# Patient Record
Sex: MALE | Race: WHITE | NOT HISPANIC OR LATINO
[De-identification: names, ages, dates, MRNs, and addresses within clinical notes are randomized per-mention and may not be internally consistent; named-entity substitution may affect disease eponyms.]

---

## 2017-01-10 ENCOUNTER — RX RENEWAL (OUTPATIENT)
Age: 72
End: 2017-01-10

## 2017-02-28 ENCOUNTER — RX RENEWAL (OUTPATIENT)
Age: 72
End: 2017-02-28

## 2017-03-28 ENCOUNTER — APPOINTMENT (OUTPATIENT)
Dept: INTERNAL MEDICINE | Facility: CLINIC | Age: 72
End: 2017-03-28

## 2017-03-28 VITALS
WEIGHT: 201 LBS | BODY MASS INDEX: 27.22 KG/M2 | DIASTOLIC BLOOD PRESSURE: 75 MMHG | SYSTOLIC BLOOD PRESSURE: 125 MMHG | HEIGHT: 72 IN | HEART RATE: 66 BPM

## 2017-03-28 DIAGNOSIS — Z23 ENCOUNTER FOR IMMUNIZATION: ICD-10-CM

## 2017-03-29 LAB
ALBUMIN SERPL ELPH-MCNC: 4.2 G/DL
ALP BLD-CCNC: 79 U/L
ALT SERPL-CCNC: 21 U/L
ANION GAP SERPL CALC-SCNC: 18 MMOL/L
AST SERPL-CCNC: 20 U/L
BASOPHILS # BLD AUTO: 0.03 K/UL
BASOPHILS NFR BLD AUTO: 0.4 %
BILIRUB SERPL-MCNC: 0.6 MG/DL
BUN SERPL-MCNC: 17 MG/DL
CALCIUM SERPL-MCNC: 9.2 MG/DL
CHLORIDE SERPL-SCNC: 102 MMOL/L
CHOLEST SERPL-MCNC: 166 MG/DL
CHOLEST/HDLC SERPL: 3.9 RATIO
CO2 SERPL-SCNC: 20 MMOL/L
CREAT SERPL-MCNC: 1.08 MG/DL
EOSINOPHIL # BLD AUTO: 0.13 K/UL
EOSINOPHIL NFR BLD AUTO: 1.5 %
GLUCOSE SERPL-MCNC: 123 MG/DL
HCT VFR BLD CALC: 44 %
HDLC SERPL-MCNC: 43 MG/DL
HGB BLD-MCNC: 14.4 G/DL
IMM GRANULOCYTES NFR BLD AUTO: 0.1 %
LDLC SERPL CALC-MCNC: 101 MG/DL
LYMPHOCYTES # BLD AUTO: 1.6 K/UL
LYMPHOCYTES NFR BLD AUTO: 19 %
MAN DIFF?: NORMAL
MCHC RBC-ENTMCNC: 30.1 PG
MCHC RBC-ENTMCNC: 32.7 GM/DL
MCV RBC AUTO: 92.1 FL
MONOCYTES # BLD AUTO: 0.51 K/UL
MONOCYTES NFR BLD AUTO: 6.1 %
NEUTROPHILS # BLD AUTO: 6.12 K/UL
NEUTROPHILS NFR BLD AUTO: 72.9 %
PLATELET # BLD AUTO: 214 K/UL
POTASSIUM SERPL-SCNC: 4.5 MMOL/L
PROT SERPL-MCNC: 7.2 G/DL
RBC # BLD: 4.78 M/UL
RBC # FLD: 14.1 %
SODIUM SERPL-SCNC: 140 MMOL/L
TRIGL SERPL-MCNC: 109 MG/DL
TSH SERPL-ACNC: 4.03 UIU/ML
WBC # FLD AUTO: 8.4 K/UL

## 2017-06-14 ENCOUNTER — RX RENEWAL (OUTPATIENT)
Age: 72
End: 2017-06-14

## 2017-06-21 ENCOUNTER — FORM ENCOUNTER (OUTPATIENT)
Age: 72
End: 2017-06-21

## 2017-06-22 ENCOUNTER — APPOINTMENT (OUTPATIENT)
Dept: ULTRASOUND IMAGING | Facility: CLINIC | Age: 72
End: 2017-06-22

## 2017-06-22 ENCOUNTER — APPOINTMENT (OUTPATIENT)
Dept: INTERNAL MEDICINE | Facility: CLINIC | Age: 72
End: 2017-06-22

## 2017-06-22 ENCOUNTER — OUTPATIENT (OUTPATIENT)
Dept: OUTPATIENT SERVICES | Facility: HOSPITAL | Age: 72
LOS: 1 days | End: 2017-06-22
Payer: MEDICARE

## 2017-06-22 ENCOUNTER — EMERGENCY (EMERGENCY)
Facility: HOSPITAL | Age: 72
LOS: 1 days | Discharge: HOME | End: 2017-06-22
Attending: EMERGENCY MEDICINE
Payer: MEDICARE

## 2017-06-22 VITALS
HEIGHT: 72 IN | BODY MASS INDEX: 27.77 KG/M2 | WEIGHT: 205 LBS | SYSTOLIC BLOOD PRESSURE: 130 MMHG | DIASTOLIC BLOOD PRESSURE: 75 MMHG | HEART RATE: 69 BPM

## 2017-06-22 VITALS
HEART RATE: 63 BPM | WEIGHT: 205.03 LBS | OXYGEN SATURATION: 98 % | DIASTOLIC BLOOD PRESSURE: 73 MMHG | TEMPERATURE: 98 F | RESPIRATION RATE: 18 BRPM | SYSTOLIC BLOOD PRESSURE: 164 MMHG

## 2017-06-22 DIAGNOSIS — M79.89 OTHER SPECIFIED SOFT TISSUE DISORDERS: ICD-10-CM

## 2017-06-22 DIAGNOSIS — M79.662 PAIN IN LEFT LOWER LEG: ICD-10-CM

## 2017-06-22 DIAGNOSIS — I10 ESSENTIAL (PRIMARY) HYPERTENSION: ICD-10-CM

## 2017-06-22 DIAGNOSIS — Z79.82 LONG TERM (CURRENT) USE OF ASPIRIN: ICD-10-CM

## 2017-06-22 DIAGNOSIS — I82.402 ACUTE EMBOLISM AND THROMBOSIS OF UNSPECIFIED DEEP VEINS OF LEFT LOWER EXTREMITY: ICD-10-CM

## 2017-06-22 DIAGNOSIS — E78.5 HYPERLIPIDEMIA, UNSPECIFIED: ICD-10-CM

## 2017-06-22 DIAGNOSIS — R60.9 EDEMA, UNSPECIFIED: ICD-10-CM

## 2017-06-22 DIAGNOSIS — Z79.899 OTHER LONG TERM (CURRENT) DRUG THERAPY: ICD-10-CM

## 2017-06-22 PROCEDURE — 93971 EXTREMITY STUDY: CPT | Mod: 26

## 2017-06-22 PROCEDURE — 73610 X-RAY EXAM OF ANKLE: CPT

## 2017-06-22 PROCEDURE — 73562 X-RAY EXAM OF KNEE 3: CPT | Mod: 26,LT

## 2017-06-22 PROCEDURE — 99283 EMERGENCY DEPT VISIT LOW MDM: CPT

## 2017-06-22 PROCEDURE — 93971 EXTREMITY STUDY: CPT

## 2017-06-22 PROCEDURE — 73610 X-RAY EXAM OF ANKLE: CPT | Mod: 26,LT

## 2017-06-22 PROCEDURE — 73562 X-RAY EXAM OF KNEE 3: CPT

## 2017-06-22 RX ORDER — PITAVASTATIN CALCIUM 1.04 MG/1
1 TABLET, FILM COATED ORAL
Qty: 0 | Refills: 0 | COMMUNITY

## 2017-06-22 RX ORDER — ACETAMINOPHEN 500 MG
2 TABLET ORAL
Qty: 50 | Refills: 0 | OUTPATIENT
Start: 2017-06-22

## 2017-06-22 RX ORDER — APIXABAN 2.5 MG/1
2 TABLET, FILM COATED ORAL
Qty: 28 | Refills: 0
Start: 2017-06-22

## 2017-06-22 RX ORDER — ASPIRIN/CALCIUM CARB/MAGNESIUM 324 MG
1 TABLET ORAL
Qty: 0 | Refills: 0 | COMMUNITY

## 2017-06-22 RX ORDER — APIXABAN 2.5 MG/1
10 TABLET, FILM COATED ORAL ONCE
Qty: 0 | Refills: 0 | Status: COMPLETED | OUTPATIENT
Start: 2017-06-22 | End: 2017-06-22

## 2017-06-22 RX ORDER — METHYLPREDNISOLONE 4 MG/1
4 TABLET ORAL
Qty: 1 | Refills: 0 | Status: DISCONTINUED | COMMUNITY
Start: 2017-06-22 | End: 2017-06-22

## 2017-06-22 RX ADMIN — APIXABAN 10 MILLIGRAM(S): 2.5 TABLET, FILM COATED ORAL at 22:37

## 2017-06-22 NOTE — ED PROVIDER NOTE - OBJECTIVE STATEMENT
71 y/o M with PMHx of HTN, HLD presents to c/o LLE swelling/pain that began 1 week ago. Pt states he was playing golf when he "rolled" his L ankle, pt had been applying ice to the area twice daily since onset. Pt went on a trip to [place] 2 days ago and states his sx worsened and began to travel up his L leg. Pt was evaluated by PMD today, had Doppler of LLE which found blood clot. Denies fever, chills, chest pain, SOB, abd pain, n/v/d. Pt currently takes ASA. Denies hx of cardiac stents. No further complaints at this time. PMD is Dr. Fairbanks. Cardiologist is Dr. Matias. ISRA.

## 2017-06-25 ENCOUNTER — FORM ENCOUNTER (OUTPATIENT)
Age: 72
End: 2017-06-25

## 2017-06-26 ENCOUNTER — APPOINTMENT (OUTPATIENT)
Dept: CT IMAGING | Facility: CLINIC | Age: 72
End: 2017-06-26

## 2017-06-26 ENCOUNTER — APPOINTMENT (OUTPATIENT)
Dept: INTERNAL MEDICINE | Facility: CLINIC | Age: 72
End: 2017-06-26

## 2017-06-26 ENCOUNTER — OUTPATIENT (OUTPATIENT)
Dept: OUTPATIENT SERVICES | Facility: HOSPITAL | Age: 72
LOS: 1 days | End: 2017-06-26
Payer: MEDICARE

## 2017-06-26 VITALS
SYSTOLIC BLOOD PRESSURE: 124 MMHG | HEIGHT: 72 IN | BODY MASS INDEX: 28.17 KG/M2 | RESPIRATION RATE: 14 BRPM | OXYGEN SATURATION: 97 % | WEIGHT: 208 LBS | DIASTOLIC BLOOD PRESSURE: 88 MMHG | HEART RATE: 66 BPM

## 2017-06-26 DIAGNOSIS — I82.412 ACUTE EMBOLISM AND THROMBOSIS OF LEFT FEMORAL VEIN: ICD-10-CM

## 2017-06-26 PROCEDURE — 71260 CT THORAX DX C+: CPT

## 2017-06-26 PROCEDURE — 74177 CT ABD & PELVIS W/CONTRAST: CPT

## 2017-06-26 PROCEDURE — 82565 ASSAY OF CREATININE: CPT

## 2017-06-27 ENCOUNTER — RX RENEWAL (OUTPATIENT)
Age: 72
End: 2017-06-27

## 2017-06-28 ENCOUNTER — OUTPATIENT (OUTPATIENT)
Dept: OUTPATIENT SERVICES | Facility: HOSPITAL | Age: 72
LOS: 1 days | Discharge: HOME | End: 2017-06-28

## 2017-06-28 DIAGNOSIS — I74.9 EMBOLISM AND THROMBOSIS OF UNSPECIFIED ARTERY: ICD-10-CM

## 2017-06-29 DIAGNOSIS — M79.89 OTHER SPECIFIED SOFT TISSUE DISORDERS: ICD-10-CM

## 2017-06-29 DIAGNOSIS — I82.412 ACUTE EMBOLISM AND THROMBOSIS OF LEFT FEMORAL VEIN: ICD-10-CM

## 2017-06-29 DIAGNOSIS — M25.572 PAIN IN LEFT ANKLE AND JOINTS OF LEFT FOOT: ICD-10-CM

## 2017-06-29 DIAGNOSIS — M25.562 PAIN IN LEFT KNEE: ICD-10-CM

## 2017-06-30 ENCOUNTER — RESULT REVIEW (OUTPATIENT)
Age: 72
End: 2017-06-30

## 2017-06-30 ENCOUNTER — LABORATORY RESULT (OUTPATIENT)
Age: 72
End: 2017-06-30

## 2017-06-30 ENCOUNTER — APPOINTMENT (OUTPATIENT)
Dept: HEMATOLOGY ONCOLOGY | Facility: CLINIC | Age: 72
End: 2017-06-30

## 2017-06-30 VITALS
DIASTOLIC BLOOD PRESSURE: 74 MMHG | SYSTOLIC BLOOD PRESSURE: 154 MMHG | RESPIRATION RATE: 16 BRPM | HEART RATE: 66 BPM | HEIGHT: 70.47 IN | BODY MASS INDEX: 29.82 KG/M2 | OXYGEN SATURATION: 66 % | TEMPERATURE: 97.9 F | WEIGHT: 210.65 LBS

## 2017-06-30 LAB
BASOPHILS # BLD AUTO: 0.1 K/UL — SIGNIFICANT CHANGE UP (ref 0–0.2)
BASOPHILS NFR BLD AUTO: 1 % — SIGNIFICANT CHANGE UP (ref 0–2)
EOSINOPHIL # BLD AUTO: 0.3 K/UL — SIGNIFICANT CHANGE UP (ref 0–0.5)
EOSINOPHIL NFR BLD AUTO: 3.6 % — SIGNIFICANT CHANGE UP (ref 0–6)
HCT VFR BLD CALC: 41 % — SIGNIFICANT CHANGE UP (ref 39–50)
HGB BLD-MCNC: 13.4 G/DL — SIGNIFICANT CHANGE UP (ref 13–17)
LYMPHOCYTES # BLD AUTO: 1.3 K/UL — SIGNIFICANT CHANGE UP (ref 1–3.3)
LYMPHOCYTES # BLD AUTO: 18 % — SIGNIFICANT CHANGE UP (ref 13–44)
MCHC RBC-ENTMCNC: 29.8 PG — SIGNIFICANT CHANGE UP (ref 27–34)
MCHC RBC-ENTMCNC: 32.8 GM/DL — SIGNIFICANT CHANGE UP (ref 32–36)
MCV RBC AUTO: 90.9 FL — SIGNIFICANT CHANGE UP (ref 80–100)
MONOCYTES # BLD AUTO: 0.7 K/UL — SIGNIFICANT CHANGE UP (ref 0–0.9)
MONOCYTES NFR BLD AUTO: 9.4 % — SIGNIFICANT CHANGE UP (ref 2–14)
NEUTROPHILS # BLD AUTO: 4.8 K/UL — SIGNIFICANT CHANGE UP (ref 1.8–7.4)
NEUTROPHILS NFR BLD AUTO: 67.9 % — SIGNIFICANT CHANGE UP (ref 43–77)
PLATELET # BLD AUTO: 301 K/UL — SIGNIFICANT CHANGE UP (ref 150–400)
RBC # BLD: 4.51 M/UL — SIGNIFICANT CHANGE UP (ref 4.2–5.8)
RBC # FLD: 12.7 % — SIGNIFICANT CHANGE UP (ref 10.3–14.5)
WBC # BLD: 7.1 K/UL — SIGNIFICANT CHANGE UP (ref 3.8–10.5)
WBC # FLD AUTO: 7.1 K/UL — SIGNIFICANT CHANGE UP (ref 3.8–10.5)

## 2017-07-03 ENCOUNTER — RX RENEWAL (OUTPATIENT)
Age: 72
End: 2017-07-03

## 2017-07-03 DIAGNOSIS — I82.412 ACUTE EMBOLISM AND THROMBOSIS OF LEFT FEMORAL VEIN: ICD-10-CM

## 2017-07-12 ENCOUNTER — APPOINTMENT (OUTPATIENT)
Dept: INTERNAL MEDICINE | Facility: CLINIC | Age: 72
End: 2017-07-12

## 2017-07-12 VITALS
HEART RATE: 68 BPM | DIASTOLIC BLOOD PRESSURE: 84 MMHG | WEIGHT: 210 LBS | HEIGHT: 70 IN | SYSTOLIC BLOOD PRESSURE: 136 MMHG | RESPIRATION RATE: 16 BRPM | BODY MASS INDEX: 30.06 KG/M2

## 2017-07-12 DIAGNOSIS — M79.605 PAIN IN LEFT LEG: ICD-10-CM

## 2017-07-13 LAB
ALBUMIN SERPL ELPH-MCNC: 4.3 G/DL
ALP BLD-CCNC: 76 U/L
ALT SERPL-CCNC: 17 U/L
ANION GAP SERPL CALC-SCNC: 20 MMOL/L
AST SERPL-CCNC: 18 U/L
BASOPHILS # BLD AUTO: 0.02 K/UL
BASOPHILS NFR BLD AUTO: 0.3 %
BILIRUB SERPL-MCNC: 0.4 MG/DL
BUN SERPL-MCNC: 16 MG/DL
CALCIUM SERPL-MCNC: 9.1 MG/DL
CHLORIDE SERPL-SCNC: 102 MMOL/L
CHOLEST SERPL-MCNC: 167 MG/DL
CHOLEST/HDLC SERPL: 3.8 RATIO
CO2 SERPL-SCNC: 20 MMOL/L
CREAT SERPL-MCNC: 0.93 MG/DL
EOSINOPHIL # BLD AUTO: 0.21 K/UL
EOSINOPHIL NFR BLD AUTO: 2.6 %
GLUCOSE SERPL-MCNC: 89 MG/DL
HCT VFR BLD CALC: 40.7 %
HDLC SERPL-MCNC: 44 MG/DL
HGB BLD-MCNC: 13.1 G/DL
IMM GRANULOCYTES NFR BLD AUTO: 0.4 %
LDLC SERPL CALC-MCNC: 96 MG/DL
LYMPHOCYTES # BLD AUTO: 1.58 K/UL
LYMPHOCYTES NFR BLD AUTO: 19.8 %
MAN DIFF?: NORMAL
MCHC RBC-ENTMCNC: 29.5 PG
MCHC RBC-ENTMCNC: 32.2 GM/DL
MCV RBC AUTO: 91.7 FL
MONOCYTES # BLD AUTO: 0.66 K/UL
MONOCYTES NFR BLD AUTO: 8.3 %
NEUTROPHILS # BLD AUTO: 5.49 K/UL
NEUTROPHILS NFR BLD AUTO: 68.6 %
PLATELET # BLD AUTO: 215 K/UL
POTASSIUM SERPL-SCNC: 4.3 MMOL/L
PROT SERPL-MCNC: 7.6 G/DL
RBC # BLD: 4.44 M/UL
RBC # FLD: 14.5 %
SODIUM SERPL-SCNC: 142 MMOL/L
TRIGL SERPL-MCNC: 135 MG/DL
TSH SERPL-ACNC: 4.11 UIU/ML
WBC # FLD AUTO: 7.99 K/UL

## 2017-07-19 LAB
B2 GLYCOPROT1 IGA SERPL IA-ACNC: <5 SAU
CARDIOLIPIN AB SER IA-ACNC: NEGATIVE
CARDIOLIPIN IGM SER-MCNC: 5.1 MPL
CARDIOLIPIN IGM SER-MCNC: <5 GPL

## 2017-08-28 ENCOUNTER — RX RENEWAL (OUTPATIENT)
Age: 72
End: 2017-08-28

## 2017-09-22 ENCOUNTER — OUTPATIENT (OUTPATIENT)
Dept: OUTPATIENT SERVICES | Facility: HOSPITAL | Age: 72
LOS: 1 days | Discharge: HOME | End: 2017-09-22

## 2017-09-22 DIAGNOSIS — I82.412 ACUTE EMBOLISM AND THROMBOSIS OF LEFT FEMORAL VEIN: ICD-10-CM

## 2017-09-26 ENCOUNTER — APPOINTMENT (OUTPATIENT)
Dept: HEMATOLOGY ONCOLOGY | Facility: CLINIC | Age: 72
End: 2017-09-26

## 2017-10-03 ENCOUNTER — LABORATORY RESULT (OUTPATIENT)
Age: 72
End: 2017-10-03

## 2017-10-03 ENCOUNTER — APPOINTMENT (OUTPATIENT)
Dept: INTERNAL MEDICINE | Facility: CLINIC | Age: 72
End: 2017-10-03
Payer: MEDICARE

## 2017-10-03 VITALS
HEIGHT: 70 IN | HEART RATE: 72 BPM | DIASTOLIC BLOOD PRESSURE: 86 MMHG | SYSTOLIC BLOOD PRESSURE: 148 MMHG | WEIGHT: 211 LBS | BODY MASS INDEX: 30.21 KG/M2

## 2017-10-03 PROCEDURE — 99214 OFFICE O/P EST MOD 30 MIN: CPT | Mod: 25

## 2017-10-03 PROCEDURE — G0008: CPT

## 2017-10-03 PROCEDURE — 90662 IIV NO PRSV INCREASED AG IM: CPT

## 2017-10-03 PROCEDURE — 36415 COLL VENOUS BLD VENIPUNCTURE: CPT

## 2017-10-07 LAB
ALBUMIN SERPL ELPH-MCNC: 4.2 G/DL
ALP BLD-CCNC: 69 U/L
ALT SERPL-CCNC: 18 U/L
ANION GAP SERPL CALC-SCNC: 16 MMOL/L
AST SERPL-CCNC: 18 U/L
BASOPHILS # BLD AUTO: 0.03 K/UL
BASOPHILS NFR BLD AUTO: 0.4 %
BILIRUB SERPL-MCNC: 0.4 MG/DL
BUN SERPL-MCNC: 17 MG/DL
CALCIUM SERPL-MCNC: 10.4 MG/DL
CHLORIDE SERPL-SCNC: 104 MMOL/L
CHOLEST SERPL-MCNC: 168 MG/DL
CHOLEST/HDLC SERPL: 3.9 RATIO
CO2 SERPL-SCNC: 22 MMOL/L
CREAT SERPL-MCNC: 1.1 MG/DL
EOSINOPHIL # BLD AUTO: 0.24 K/UL
EOSINOPHIL NFR BLD AUTO: 3.4 %
GLUCOSE SERPL-MCNC: 79 MG/DL
HCT VFR BLD CALC: 42.5 %
HDLC SERPL-MCNC: 43 MG/DL
HGB BLD-MCNC: 13.8 G/DL
IMM GRANULOCYTES NFR BLD AUTO: 0.3 %
LDLC SERPL CALC-MCNC: 100 MG/DL
LYMPHOCYTES # BLD AUTO: 1.76 K/UL
LYMPHOCYTES NFR BLD AUTO: 24.9 %
MAN DIFF?: NORMAL
MCHC RBC-ENTMCNC: 30.1 PG
MCHC RBC-ENTMCNC: 32.5 GM/DL
MCV RBC AUTO: 92.8 FL
MONOCYTES # BLD AUTO: 0.45 K/UL
MONOCYTES NFR BLD AUTO: 6.4 %
NEUTROPHILS # BLD AUTO: 4.56 K/UL
NEUTROPHILS NFR BLD AUTO: 64.6 %
PLATELET # BLD AUTO: 205 K/UL
POTASSIUM SERPL-SCNC: 4.9 MMOL/L
PROT SERPL-MCNC: 7.3 G/DL
RBC # BLD: 4.58 M/UL
RBC # FLD: 15.2 %
SODIUM SERPL-SCNC: 142 MMOL/L
TRIGL SERPL-MCNC: 123 MG/DL
TSH SERPL-ACNC: 5.31 UIU/ML
WBC # FLD AUTO: 7.06 K/UL

## 2017-12-05 ENCOUNTER — APPOINTMENT (OUTPATIENT)
Dept: INTERNAL MEDICINE | Facility: CLINIC | Age: 72
End: 2017-12-05
Payer: MEDICARE

## 2017-12-05 VITALS
SYSTOLIC BLOOD PRESSURE: 125 MMHG | HEART RATE: 67 BPM | DIASTOLIC BLOOD PRESSURE: 80 MMHG | BODY MASS INDEX: 28.49 KG/M2 | WEIGHT: 199 LBS | RESPIRATION RATE: 11 BRPM | HEIGHT: 70 IN

## 2017-12-05 DIAGNOSIS — R79.89 OTHER SPECIFIED ABNORMAL FINDINGS OF BLOOD CHEMISTRY: ICD-10-CM

## 2017-12-05 DIAGNOSIS — Z92.29 PERSONAL HISTORY OF OTHER DRUG THERAPY: ICD-10-CM

## 2017-12-05 DIAGNOSIS — K63.5 POLYP OF COLON: ICD-10-CM

## 2017-12-05 DIAGNOSIS — S99.912A UNSPECIFIED INJURY OF LEFT ANKLE, INITIAL ENCOUNTER: ICD-10-CM

## 2017-12-05 PROCEDURE — 36415 COLL VENOUS BLD VENIPUNCTURE: CPT

## 2017-12-05 PROCEDURE — G0439: CPT

## 2017-12-06 ENCOUNTER — RESULT REVIEW (OUTPATIENT)
Age: 72
End: 2017-12-06

## 2017-12-06 LAB
ALBUMIN SERPL ELPH-MCNC: 4.5 G/DL
ALP BLD-CCNC: 71 U/L
ALT SERPL-CCNC: 17 U/L
ANION GAP SERPL CALC-SCNC: 15 MMOL/L
AST SERPL-CCNC: 18 U/L
BASOPHILS # BLD AUTO: 0.03 K/UL
BASOPHILS NFR BLD AUTO: 0.4 %
BILIRUB SERPL-MCNC: 0.5 MG/DL
BUN SERPL-MCNC: 17 MG/DL
CALCIUM SERPL-MCNC: 9.2 MG/DL
CHLORIDE SERPL-SCNC: 104 MMOL/L
CHOLEST SERPL-MCNC: 154 MG/DL
CHOLEST/HDLC SERPL: 3.4 RATIO
CO2 SERPL-SCNC: 24 MMOL/L
CREAT SERPL-MCNC: 1.03 MG/DL
EOSINOPHIL # BLD AUTO: 0.18 K/UL
EOSINOPHIL NFR BLD AUTO: 2.7 %
GLUCOSE SERPL-MCNC: 87 MG/DL
HCT VFR BLD CALC: 43 %
HDLC SERPL-MCNC: 45 MG/DL
HGB BLD-MCNC: 14.1 G/DL
IMM GRANULOCYTES NFR BLD AUTO: 0 %
LDLC SERPL CALC-MCNC: 88 MG/DL
LYMPHOCYTES # BLD AUTO: 2 K/UL
LYMPHOCYTES NFR BLD AUTO: 29.7 %
MAN DIFF?: NORMAL
MCHC RBC-ENTMCNC: 30.6 PG
MCHC RBC-ENTMCNC: 32.8 GM/DL
MCV RBC AUTO: 93.3 FL
MONOCYTES # BLD AUTO: 0.58 K/UL
MONOCYTES NFR BLD AUTO: 8.6 %
NEUTROPHILS # BLD AUTO: 3.95 K/UL
NEUTROPHILS NFR BLD AUTO: 58.6 %
PLATELET # BLD AUTO: 196 K/UL
POTASSIUM SERPL-SCNC: 4.5 MMOL/L
PROT SERPL-MCNC: 7.3 G/DL
RBC # BLD: 4.61 M/UL
RBC # FLD: 14.8 %
SODIUM SERPL-SCNC: 143 MMOL/L
TRIGL SERPL-MCNC: 107 MG/DL
TSH SERPL-ACNC: 3.21 UIU/ML
WBC # FLD AUTO: 6.74 K/UL

## 2017-12-11 ENCOUNTER — RX RENEWAL (OUTPATIENT)
Age: 72
End: 2017-12-11

## 2017-12-13 ENCOUNTER — RX RENEWAL (OUTPATIENT)
Age: 72
End: 2017-12-13

## 2018-01-02 ENCOUNTER — RX RENEWAL (OUTPATIENT)
Age: 73
End: 2018-01-02

## 2018-02-15 ENCOUNTER — APPOINTMENT (OUTPATIENT)
Dept: INTERNAL MEDICINE | Facility: CLINIC | Age: 73
End: 2018-02-15
Payer: MEDICARE

## 2018-02-15 VITALS
DIASTOLIC BLOOD PRESSURE: 80 MMHG | WEIGHT: 199 LBS | TEMPERATURE: 98 F | HEIGHT: 70 IN | BODY MASS INDEX: 28.49 KG/M2 | HEART RATE: 74 BPM | SYSTOLIC BLOOD PRESSURE: 120 MMHG

## 2018-02-15 PROCEDURE — 99214 OFFICE O/P EST MOD 30 MIN: CPT

## 2018-02-26 ENCOUNTER — RX RENEWAL (OUTPATIENT)
Age: 73
End: 2018-02-26

## 2018-04-06 ENCOUNTER — APPOINTMENT (OUTPATIENT)
Dept: INTERNAL MEDICINE | Facility: CLINIC | Age: 73
End: 2018-04-06
Payer: MEDICARE

## 2018-04-06 VITALS
HEART RATE: 68 BPM | DIASTOLIC BLOOD PRESSURE: 82 MMHG | SYSTOLIC BLOOD PRESSURE: 125 MMHG | WEIGHT: 198 LBS | BODY MASS INDEX: 28.35 KG/M2 | HEIGHT: 70 IN

## 2018-04-06 DIAGNOSIS — J06.9 ACUTE UPPER RESPIRATORY INFECTION, UNSPECIFIED: ICD-10-CM

## 2018-04-06 DIAGNOSIS — B97.89 ACUTE UPPER RESPIRATORY INFECTION, UNSPECIFIED: ICD-10-CM

## 2018-04-06 DIAGNOSIS — Z87.898 PERSONAL HISTORY OF OTHER SPECIFIED CONDITIONS: ICD-10-CM

## 2018-04-06 PROCEDURE — 36415 COLL VENOUS BLD VENIPUNCTURE: CPT

## 2018-04-06 PROCEDURE — 99214 OFFICE O/P EST MOD 30 MIN: CPT | Mod: 25

## 2018-04-06 RX ORDER — CEFDINIR 300 MG/1
300 CAPSULE ORAL TWICE DAILY
Qty: 20 | Refills: 0 | Status: COMPLETED | COMMUNITY
Start: 2018-02-15 | End: 2018-02-25

## 2018-04-09 ENCOUNTER — RESULT REVIEW (OUTPATIENT)
Age: 73
End: 2018-04-09

## 2018-04-09 LAB
ALBUMIN SERPL ELPH-MCNC: 4.5 G/DL
ALP BLD-CCNC: 73 U/L
ALT SERPL-CCNC: 20 U/L
ANION GAP SERPL CALC-SCNC: 12 MMOL/L
AST SERPL-CCNC: 16 U/L
BASOPHILS # BLD AUTO: 0.04 K/UL
BASOPHILS NFR BLD AUTO: 0.7 %
BILIRUB SERPL-MCNC: 0.5 MG/DL
BUN SERPL-MCNC: 22 MG/DL
CALCIUM SERPL-MCNC: 9.7 MG/DL
CHLORIDE SERPL-SCNC: 104 MMOL/L
CHOLEST SERPL-MCNC: 181 MG/DL
CHOLEST/HDLC SERPL: 3.5 RATIO
CO2 SERPL-SCNC: 25 MMOL/L
CREAT SERPL-MCNC: 1.16 MG/DL
EOSINOPHIL # BLD AUTO: 0.2 K/UL
EOSINOPHIL NFR BLD AUTO: 3.3 %
GLUCOSE SERPL-MCNC: 110 MG/DL
HCT VFR BLD CALC: 42.6 %
HCV AB SER QL: NONREACTIVE
HCV S/CO RATIO: 0.17 S/CO
HDLC SERPL-MCNC: 51 MG/DL
HGB BLD-MCNC: 14 G/DL
HIV1+2 AB SPEC QL IA.RAPID: NONREACTIVE
IMM GRANULOCYTES NFR BLD AUTO: 0.3 %
LDLC SERPL CALC-MCNC: 105 MG/DL
LYMPHOCYTES # BLD AUTO: 1.72 K/UL
LYMPHOCYTES NFR BLD AUTO: 28.7 %
MAN DIFF?: NORMAL
MCHC RBC-ENTMCNC: 30.4 PG
MCHC RBC-ENTMCNC: 32.9 GM/DL
MCV RBC AUTO: 92.4 FL
MONOCYTES # BLD AUTO: 0.48 K/UL
MONOCYTES NFR BLD AUTO: 8 %
NEUTROPHILS # BLD AUTO: 3.53 K/UL
NEUTROPHILS NFR BLD AUTO: 59 %
PLATELET # BLD AUTO: 212 K/UL
POTASSIUM SERPL-SCNC: 4.8 MMOL/L
PROT SERPL-MCNC: 7.3 G/DL
RBC # BLD: 4.61 M/UL
RBC # FLD: 14.7 %
SODIUM SERPL-SCNC: 141 MMOL/L
TRIGL SERPL-MCNC: 123 MG/DL
TSH SERPL-ACNC: 4.93 UIU/ML
WBC # FLD AUTO: 5.99 K/UL

## 2018-06-11 ENCOUNTER — RX RENEWAL (OUTPATIENT)
Age: 73
End: 2018-06-11

## 2018-07-18 ENCOUNTER — RX CHANGE (OUTPATIENT)
Age: 73
End: 2018-07-18

## 2018-07-24 ENCOUNTER — APPOINTMENT (OUTPATIENT)
Dept: INTERNAL MEDICINE | Facility: CLINIC | Age: 73
End: 2018-07-24
Payer: MEDICARE

## 2018-07-26 PROBLEM — R79.89 ABNORMAL THYROID BLOOD TEST: Status: RESOLVED | Noted: 2017-10-05 | Resolved: 2018-07-26

## 2018-08-25 ENCOUNTER — RX RENEWAL (OUTPATIENT)
Age: 73
End: 2018-08-25

## 2018-08-30 ENCOUNTER — APPOINTMENT (OUTPATIENT)
Dept: INTERNAL MEDICINE | Facility: CLINIC | Age: 73
End: 2018-08-30
Payer: MEDICARE

## 2018-08-30 VITALS
HEART RATE: 78 BPM | SYSTOLIC BLOOD PRESSURE: 130 MMHG | WEIGHT: 198 LBS | DIASTOLIC BLOOD PRESSURE: 80 MMHG | OXYGEN SATURATION: 97 % | BODY MASS INDEX: 28.35 KG/M2 | HEIGHT: 70 IN

## 2018-08-30 DIAGNOSIS — Z11.59 ENCOUNTER FOR SCREENING FOR OTHER VIRAL DISEASES: ICD-10-CM

## 2018-08-30 DIAGNOSIS — Z11.4 ENCOUNTER FOR SCREENING FOR HUMAN IMMUNODEFICIENCY VIRUS [HIV]: ICD-10-CM

## 2018-08-30 PROCEDURE — 36415 COLL VENOUS BLD VENIPUNCTURE: CPT

## 2018-08-30 PROCEDURE — 99214 OFFICE O/P EST MOD 30 MIN: CPT | Mod: 25

## 2018-08-30 RX ORDER — OMEPRAZOLE 20 MG/1
20 CAPSULE, DELAYED RELEASE ORAL
Qty: 90 | Refills: 1 | Status: DISCONTINUED | COMMUNITY
Start: 2017-06-27 | End: 2018-08-30

## 2018-08-31 ENCOUNTER — RX RENEWAL (OUTPATIENT)
Age: 73
End: 2018-08-31

## 2018-08-31 LAB
ALBUMIN SERPL ELPH-MCNC: 5 G/DL
ALP BLD-CCNC: 75 U/L
ALT SERPL-CCNC: 24 U/L
ANION GAP SERPL CALC-SCNC: 18 MMOL/L
AST SERPL-CCNC: 27 U/L
BASOPHILS # BLD AUTO: 0.03 K/UL
BASOPHILS NFR BLD AUTO: 0.4 %
BILIRUB SERPL-MCNC: 0.8 MG/DL
BUN SERPL-MCNC: 31 MG/DL
CALCIUM SERPL-MCNC: 9.7 MG/DL
CHLORIDE SERPL-SCNC: 105 MMOL/L
CHOLEST SERPL-MCNC: 178 MG/DL
CHOLEST/HDLC SERPL: 3.5 RATIO
CO2 SERPL-SCNC: 22 MMOL/L
CREAT SERPL-MCNC: 1.41 MG/DL
EOSINOPHIL # BLD AUTO: 0.08 K/UL
EOSINOPHIL NFR BLD AUTO: 1.1 %
GLUCOSE SERPL-MCNC: 91 MG/DL
HBA1C MFR BLD HPLC: 5.9 %
HCT VFR BLD CALC: 43 %
HDLC SERPL-MCNC: 51 MG/DL
HGB BLD-MCNC: 14.4 G/DL
IMM GRANULOCYTES NFR BLD AUTO: 0.3 %
LDLC SERPL CALC-MCNC: 111 MG/DL
LYMPHOCYTES # BLD AUTO: 2.24 K/UL
LYMPHOCYTES NFR BLD AUTO: 30.2 %
MAN DIFF?: NORMAL
MCHC RBC-ENTMCNC: 31.4 PG
MCHC RBC-ENTMCNC: 33.5 GM/DL
MCV RBC AUTO: 93.9 FL
MONOCYTES # BLD AUTO: 0.48 K/UL
MONOCYTES NFR BLD AUTO: 6.5 %
NEUTROPHILS # BLD AUTO: 4.57 K/UL
NEUTROPHILS NFR BLD AUTO: 61.5 %
PLATELET # BLD AUTO: 207 K/UL
POTASSIUM SERPL-SCNC: 4.3 MMOL/L
PROT SERPL-MCNC: 7.6 G/DL
RBC # BLD: 4.58 M/UL
RBC # FLD: 15 %
SODIUM SERPL-SCNC: 144 MMOL/L
TRIGL SERPL-MCNC: 80 MG/DL
TSH SERPL-ACNC: 4.55 UIU/ML
WBC # FLD AUTO: 7.42 K/UL

## 2018-08-31 NOTE — HISTORY OF PRESENT ILLNESS
[FreeTextEntry1] : Patient presents for followup on hypertension/hyperlipidemia/hypothyroid. Patient is currently fasting for today's labs and offers no acute complaints. Patient is currently on Avapro/Toprol for hypertension, on Livalo for hyperlipidemia and is on Synthroid for his hypothyroidism.\par -Last sugar was elevated at 110, hemoglobin A1c to be checked

## 2018-08-31 NOTE — ASSESSMENT
[FreeTextEntry1] : Labs will be sent out/ further recommendations will be made based on lab results. Patient advised to continue present medications with diet/exercise and specialist followup. Patient will return to the office in dec for CPE\par \par colonoscopy was 4/15, follow up in 3 years " to do, needs to clear with cardio"-Dr. Lundberg\par Shingrix d/w pt\par specialists include\par 1. cardiology-Dr. Matias\par 2. ophthalmology-Dr. Crawford\par 3. urology yearly-Dr. Lowe-may stop going and just have DALJIT and PSA here Qyear\par 4. podiatry on  a p.r.n. basis-Dr. Mercado/Dr. Kim\par 5.hematology-Dr. Liu-no need for follow up\par 6.  Vascular-Dr. Lennon-no need for follow up\par HIV screen=4/2018\par Hepatitis C screening=4/2018

## 2018-08-31 NOTE — ADDENDUM
[FreeTextEntry1] : Labs show\par -BUN/creatinine elevated at 31/1.4, repeat in 3 weeks\par -LDL of 111-to take cholesterol medication daily\par -TSH elevated at 4.5-increase Synthroid to 0.137 mcg daily

## 2018-09-05 ENCOUNTER — RX RENEWAL (OUTPATIENT)
Age: 73
End: 2018-09-05

## 2018-09-05 PROBLEM — I10 ESSENTIAL (PRIMARY) HYPERTENSION: Chronic | Status: ACTIVE | Noted: 2017-06-22

## 2018-09-05 PROBLEM — E03.9 HYPOTHYROIDISM, UNSPECIFIED: Chronic | Status: ACTIVE | Noted: 2017-06-22

## 2018-09-26 ENCOUNTER — APPOINTMENT (OUTPATIENT)
Dept: INTERNAL MEDICINE | Facility: CLINIC | Age: 73
End: 2018-09-26
Payer: MEDICARE

## 2018-09-26 VITALS
OXYGEN SATURATION: 98 % | DIASTOLIC BLOOD PRESSURE: 82 MMHG | WEIGHT: 203 LBS | SYSTOLIC BLOOD PRESSURE: 125 MMHG | BODY MASS INDEX: 29.06 KG/M2 | HEIGHT: 70 IN | HEART RATE: 60 BPM

## 2018-09-26 PROCEDURE — G0008: CPT

## 2018-09-26 PROCEDURE — 90662 IIV NO PRSV INCREASED AG IM: CPT

## 2018-09-26 PROCEDURE — 99213 OFFICE O/P EST LOW 20 MIN: CPT | Mod: 25

## 2018-09-26 PROCEDURE — 36415 COLL VENOUS BLD VENIPUNCTURE: CPT

## 2018-09-26 NOTE — HISTORY OF PRESENT ILLNESS
[FreeTextEntry1] : Patient presents for followup on hypertension/repeat labs Patient offers no acute complaints. Patient is currently on Avapro/Toprol for hypertension And was found to have BUN/creatinine elevated at 31/1.4 with previous blood work, abnormal results discussed with patient and questions answered

## 2018-09-26 NOTE — ASSESSMENT
[FreeTextEntry1] : Labs will be sent out/ further recommendations will be made based on lab results. Patient advised to continue present medications with diet/exercise and specialist followup.High-dose flu vaccine given without reaction. Patient will return to the office in dec for CPE\par \par colonoscopy was 4/15, follow up in 3 years " to do, needs to clear with cardio"-Dr. Lundberg\par Shingrix d/w pt\par specialists include\par 1. cardiology-Dr. Matias\par 2. ophthalmology-Dr. Crawford\par 3. urology yearly-Dr. Lowe-may stop going and just have DALJIT and PSA here Qyear\par 4. podiatry on  a p.r.n. basis-Dr. Mercado/Dr. Kim\par 5.hematology-Dr. Liu-no need for follow up\par 6.  Vascular-Dr. Lennon-no need for follow up\par HIV screen=4/2018\par Hepatitis C screening=4/2018

## 2018-09-27 ENCOUNTER — RESULT REVIEW (OUTPATIENT)
Age: 73
End: 2018-09-27

## 2018-09-27 LAB
ANION GAP SERPL CALC-SCNC: 16 MMOL/L
BUN SERPL-MCNC: 21 MG/DL
CALCIUM SERPL-MCNC: 9.4 MG/DL
CHLORIDE SERPL-SCNC: 101 MMOL/L
CO2 SERPL-SCNC: 24 MMOL/L
CREAT SERPL-MCNC: 1.2 MG/DL
GLUCOSE SERPL-MCNC: 97 MG/DL
POTASSIUM SERPL-SCNC: 4.5 MMOL/L
SODIUM SERPL-SCNC: 140 MMOL/L

## 2018-10-29 ENCOUNTER — RX RENEWAL (OUTPATIENT)
Age: 73
End: 2018-10-29

## 2018-12-05 ENCOUNTER — APPOINTMENT (OUTPATIENT)
Dept: INTERNAL MEDICINE | Facility: CLINIC | Age: 73
End: 2018-12-05
Payer: MEDICARE

## 2018-12-05 VITALS
WEIGHT: 198 LBS | HEART RATE: 57 BPM | SYSTOLIC BLOOD PRESSURE: 130 MMHG | BODY MASS INDEX: 28.35 KG/M2 | HEIGHT: 70 IN | DIASTOLIC BLOOD PRESSURE: 80 MMHG | RESPIRATION RATE: 12 BRPM

## 2018-12-05 DIAGNOSIS — I26.99 OTHER PULMONARY EMBOLISM W/OUT ACUTE COR PULMONALE: ICD-10-CM

## 2018-12-05 PROCEDURE — 36415 COLL VENOUS BLD VENIPUNCTURE: CPT

## 2018-12-05 PROCEDURE — G0439: CPT

## 2018-12-05 NOTE — ASSESSMENT
[FreeTextEntry1] : 73-year-old male with known CAD via CTA remains asymptomatic with controlled hypertension and hyperlipidemia on present medications.Patient is clinically euthyroid on thyroid replacement therapy.\par \par The patient is status post DVT with PE June 2017 and now on Eliquis since.\par The patient is to discuss with cardiology at his next visit.\par \par Negative hypercoagulable workup.\par \par \par To continue present medications\par Continue to diet and exercise encouraged\par \par Followup with specialists as scheduled\par Colonoscopy October 2018 with followup in 5 years\par \par Influenza vaccine already received\par Up to date with all of the vaccines\par \par Followup in 3-4 months

## 2018-12-05 NOTE — PHYSICAL EXAM
[General Appearance - Alert] : alert [General Appearance - In No Acute Distress] : in no acute distress [Sclera] : the sclera and conjunctiva were normal [PERRL With Normal Accommodation] : pupils were equal in size, round, and reactive to light [Extraocular Movements] : extraocular movements were intact [Outer Ear] : the ears and nose were normal in appearance [Oropharynx] : the oropharynx was normal [Neck Appearance] : the appearance of the neck was normal [Neck Cervical Mass (___cm)] : no neck mass was observed [Jugular Venous Distention Increased] : there was no jugular-venous distention [Thyroid Diffuse Enlargement] : the thyroid was not enlarged [Thyroid Nodule] : there were no palpable thyroid nodules [Auscultation Breath Sounds / Voice Sounds] : lungs were clear to auscultation bilaterally [Heart Rate And Rhythm] : heart rate was normal and rhythm regular [Heart Sounds] : normal S1 and S2 [Heart Sounds Gallop] : no gallops [Murmurs] : no murmurs [Heart Sounds Pericardial Friction Rub] : no pericardial rub [Arterial Pulses Carotid] : carotid pulses were normal with no bruits [Abdominal Aorta] : the abdominal aorta was normal [Arterial Pulses Femoral] : femoral pulses were normal without bruits [Full Pulse] : the pedal pulses are present [Edema] : there was no peripheral edema [Veins - Varicosity Changes] : there were no varicosital changes [Bowel Sounds] : normal bowel sounds [Abdomen Soft] : soft [Abdomen Tenderness] : non-tender [Abdomen Mass (___ Cm)] : no abdominal mass palpated [Cervical Lymph Nodes Enlarged Posterior Bilaterally] : posterior cervical [Cervical Lymph Nodes Enlarged Anterior Bilaterally] : anterior cervical [Supraclavicular Lymph Nodes Enlarged Bilaterally] : supraclavicular [Axillary Lymph Nodes Enlarged Bilaterally] : axillary [Femoral Lymph Nodes Enlarged Bilaterally] : femoral [Inguinal Lymph Nodes Enlarged Bilaterally] : inguinal [No Spinal Tenderness] : no spinal tenderness [Abnormal Walk] : normal gait [Nail Clubbing] : no clubbing  or cyanosis of the fingernails [Musculoskeletal - Swelling] : no joint swelling seen [Motor Tone] : muscle strength and tone were normal [Skin Color & Pigmentation] : normal skin color and pigmentation [Skin Turgor] : normal skin turgor [] : no rash [Cranial Nerves] : cranial nerves 2-12 were intact [No Focal Deficits] : no focal deficits [Oriented To Time, Place, And Person] : oriented to person, place, and time [Impaired Insight] : insight and judgment were intact [Affect] : the affect was normal [FreeTextEntry1] : via urology

## 2018-12-05 NOTE — HEALTH RISK ASSESSMENT
[Good] : ~his/her~ current health as good [No falls in past year] : Patient reported no falls in the past year [0] : 2) Feeling down, depressed, or hopeless: Not at all (0) [None] : None [With Significant Other] : lives with significant other [# of Members in Household ___] :  household currently consist of [unfilled] member(s) [Retired] : retired [College] : College [] :  [# Of Children ___] : has [unfilled] children [Sexually Active] : sexually active [Feels Safe at Home] : Feels safe at home [Fully functional (bathing, dressing, toileting, transferring, walking, feeding)] : Fully functional (bathing, dressing, toileting, transferring, walking, feeding) [Fully functional (using the telephone, shopping, preparing meals, housekeeping, doing laundry, using] : Fully functional and needs no help or supervision to perform IADLs (using the telephone, shopping, preparing meals, housekeeping, doing laundry, using transportation, managing medications and managing finances) [Smoke Detector] : smoke detector [Carbon Monoxide Detector] : carbon monoxide detector [Guns at Home] : guns at home [Seat Belt] :  uses seat belt [Sunscreen] : uses sunscreen [Discussed at today's visit] : Advance Directives Discussed at today's visit [Very Good] : ~his/her~  mood as very good [Designated Healthcare Proxy] : Designated healthcare proxy [Name: ___] : Health Care Proxy's Name: [unfilled]  [] : No [de-identified] : Occasional [[id number]] : 0 [Change in mental status noted] : No change in mental status noted [Reports changes in hearing] : Reports no changes in hearing [Reports changes in vision] : Reports no changes in vision [Reports changes in dental health] : Reports no changes in dental health [FreeTextEntry2] :  [de-identified] : locked and safe

## 2018-12-05 NOTE — HISTORY OF PRESENT ILLNESS
[FreeTextEntry1] : 73-year-old male with history of CAD on CTA as well as hypertension on valsartan and metoprolol and hyperlipidemia on Livalo presents for his yearly physical.\par \par Patient also with history of hypothyroidism on thyroid replacement therapy.\par \par  June 2017 he had an acute leg DVT with associated pulmonary emboli. This occurred after the patient had an ankle injury. Otherwise there was no preceding incident to cause his DVT.\par The patient has been on Eliquis since.\par He did see hematology with a negative hypercoagulable workup. Hematology recommended 3 months of anticoagulation.\par \par Also being followed by vascular who felt the patient should be on anticoagulation for 6 months.\par .The patient continues on Eliquis on the recommendation of cardiology secondary to his DVT being essentially unprovoked and the patient's brother having had a history of multiple clots. Therefore, concern for some familial hypercoagulability, despite negative hypercoagulable workup.\par \par Patient generally feeling well without chest pain, palpitations or shortness of breath.\par He follows with cardiology every 6 months\par He sees urology once a year.\par \par On direct questioning patient without any complaints.\par The patient has made some good lifestyle changes mainly with dietary changes.

## 2018-12-06 ENCOUNTER — RESULT REVIEW (OUTPATIENT)
Age: 73
End: 2018-12-06

## 2018-12-06 LAB
ALBUMIN SERPL ELPH-MCNC: 4.6 G/DL
ALP BLD-CCNC: 80 U/L
ALT SERPL-CCNC: 20 U/L
ANION GAP SERPL CALC-SCNC: 14 MMOL/L
APPEARANCE: CLEAR
AST SERPL-CCNC: 20 U/L
BACTERIA: NEGATIVE
BASOPHILS # BLD AUTO: 0.04 K/UL
BASOPHILS NFR BLD AUTO: 0.5 %
BILIRUB SERPL-MCNC: 0.7 MG/DL
BILIRUBIN URINE: NEGATIVE
BLOOD URINE: ABNORMAL
BUN SERPL-MCNC: 23 MG/DL
CALCIUM SERPL-MCNC: 9.8 MG/DL
CHLORIDE SERPL-SCNC: 102 MMOL/L
CHOLEST SERPL-MCNC: 156 MG/DL
CHOLEST/HDLC SERPL: 3.4 RATIO
CO2 SERPL-SCNC: 24 MMOL/L
COLOR: YELLOW
CREAT SERPL-MCNC: 1.18 MG/DL
EOSINOPHIL # BLD AUTO: 0.18 K/UL
EOSINOPHIL NFR BLD AUTO: 2.3 %
GLUCOSE QUALITATIVE U: NEGATIVE MG/DL
GLUCOSE SERPL-MCNC: 119 MG/DL
HCT VFR BLD CALC: 44.9 %
HDLC SERPL-MCNC: 46 MG/DL
HGB BLD-MCNC: 14.4 G/DL
HYALINE CASTS: 0 /LPF
IMM GRANULOCYTES NFR BLD AUTO: 0.3 %
KETONES URINE: NEGATIVE
LDLC SERPL CALC-MCNC: 93 MG/DL
LEUKOCYTE ESTERASE URINE: NEGATIVE
LYMPHOCYTES # BLD AUTO: 1.8 K/UL
LYMPHOCYTES NFR BLD AUTO: 22.6 %
MAN DIFF?: NORMAL
MCHC RBC-ENTMCNC: 30.6 PG
MCHC RBC-ENTMCNC: 32.1 GM/DL
MCV RBC AUTO: 95.5 FL
MICROSCOPIC-UA: NORMAL
MONOCYTES # BLD AUTO: 0.53 K/UL
MONOCYTES NFR BLD AUTO: 6.7 %
NEUTROPHILS # BLD AUTO: 5.38 K/UL
NEUTROPHILS NFR BLD AUTO: 67.6 %
NITRITE URINE: NEGATIVE
PH URINE: 5
PLATELET # BLD AUTO: 238 K/UL
POTASSIUM SERPL-SCNC: 4.9 MMOL/L
PROT SERPL-MCNC: 7.8 G/DL
PROTEIN URINE: NEGATIVE MG/DL
RBC # BLD: 4.7 M/UL
RBC # FLD: 14.1 %
RED BLOOD CELLS URINE: 3 /HPF
SODIUM SERPL-SCNC: 140 MMOL/L
SPECIFIC GRAVITY URINE: 1.02
SQUAMOUS EPITHELIAL CELLS: 2 /HPF
TRIGL SERPL-MCNC: 87 MG/DL
TSH SERPL-ACNC: 2.79 UIU/ML
UROBILINOGEN URINE: NEGATIVE MG/DL
WBC # FLD AUTO: 7.95 K/UL
WHITE BLOOD CELLS URINE: 2 /HPF

## 2018-12-19 ENCOUNTER — RX RENEWAL (OUTPATIENT)
Age: 73
End: 2018-12-19

## 2018-12-20 ENCOUNTER — RX RENEWAL (OUTPATIENT)
Age: 73
End: 2018-12-20

## 2019-02-14 ENCOUNTER — RX RENEWAL (OUTPATIENT)
Age: 74
End: 2019-02-14

## 2019-03-04 ENCOUNTER — RX RENEWAL (OUTPATIENT)
Age: 74
End: 2019-03-04

## 2019-03-20 RX ORDER — IRBESARTAN 150 MG/1
150 TABLET ORAL
Qty: 90 | Refills: 1 | Status: DISCONTINUED | COMMUNITY
Start: 2018-07-18 | End: 2019-03-20

## 2019-04-30 ENCOUNTER — APPOINTMENT (OUTPATIENT)
Dept: INTERNAL MEDICINE | Facility: CLINIC | Age: 74
End: 2019-04-30
Payer: MEDICARE

## 2019-04-30 VITALS
WEIGHT: 198 LBS | OXYGEN SATURATION: 97 % | HEIGHT: 70 IN | HEART RATE: 62 BPM | BODY MASS INDEX: 28.35 KG/M2 | SYSTOLIC BLOOD PRESSURE: 130 MMHG | DIASTOLIC BLOOD PRESSURE: 80 MMHG

## 2019-04-30 DIAGNOSIS — Z92.29 PERSONAL HISTORY OF OTHER DRUG THERAPY: ICD-10-CM

## 2019-04-30 PROCEDURE — 36415 COLL VENOUS BLD VENIPUNCTURE: CPT

## 2019-04-30 PROCEDURE — 90471 IMMUNIZATION ADMIN: CPT | Mod: GY

## 2019-04-30 PROCEDURE — 99213 OFFICE O/P EST LOW 20 MIN: CPT | Mod: 25

## 2019-04-30 PROCEDURE — 90750 HZV VACC RECOMBINANT IM: CPT | Mod: GY

## 2019-04-30 NOTE — ASSESSMENT
[FreeTextEntry1] : Labs will be sent out/ further recommendations will be made based on lab results. Patient advised to continue present medications with diet/exercise and specialist followup.Shingrix #1 given without reaction. Patient will return to the office in 3-4months with Shingrix #2/ dec for CPE\par \par colonoscopy was 10/2018-next in 5 years\par vaccines are UTD\par specialists include\par 1. cardiology-Dr. Matias\par 2. ophthalmology-Dr. Crawford\par 3. urology yearly-Dr. Lowe-may stop going and just have DALJIT and PSA here Qyear\par 4. podiatry on  a p.r.n. basis-Dr. Mercado/Dr. Kim\par 5.hematology-Dr. Liu-no need for follow up\par 6.  Vascular-Dr. Lennon-no need for follow up\par HIV screen=4/2018\par Hepatitis C screening=4/2018\par Echo 12/2018

## 2019-04-30 NOTE — HISTORY OF PRESENT ILLNESS
[FreeTextEntry1] : Patient presents for followup on hypertension/hyperlipidemia/hypothyroid. Patient is currently fasting for today's labs and offers no acute complaints. Patient is currently on Atacand/Toprol for hypertension, on Livalo for hyperlipidemia and is on Synthroid for his hypothyroidism.\par

## 2019-05-01 ENCOUNTER — RESULT REVIEW (OUTPATIENT)
Age: 74
End: 2019-05-01

## 2019-05-01 LAB
ALBUMIN SERPL ELPH-MCNC: 4.7 G/DL
ALP BLD-CCNC: 64 U/L
ALT SERPL-CCNC: 18 U/L
ANION GAP SERPL CALC-SCNC: 13 MMOL/L
AST SERPL-CCNC: 17 U/L
BASOPHILS # BLD AUTO: 0.04 K/UL
BASOPHILS NFR BLD AUTO: 0.7 %
BILIRUB SERPL-MCNC: 0.5 MG/DL
BUN SERPL-MCNC: 22 MG/DL
CALCIUM SERPL-MCNC: 9.6 MG/DL
CHLORIDE SERPL-SCNC: 107 MMOL/L
CHOLEST SERPL-MCNC: 157 MG/DL
CHOLEST/HDLC SERPL: 3.3 RATIO
CO2 SERPL-SCNC: 23 MMOL/L
CREAT SERPL-MCNC: 1.12 MG/DL
EOSINOPHIL # BLD AUTO: 0.14 K/UL
EOSINOPHIL NFR BLD AUTO: 2.5 %
ESTIMATED AVERAGE GLUCOSE: 123 MG/DL
GLUCOSE SERPL-MCNC: 117 MG/DL
HBA1C MFR BLD HPLC: 5.9 %
HCT VFR BLD CALC: 46.5 %
HDLC SERPL-MCNC: 47 MG/DL
HGB BLD-MCNC: 14.7 G/DL
IMM GRANULOCYTES NFR BLD AUTO: 0.5 %
LDLC SERPL CALC-MCNC: 85 MG/DL
LYMPHOCYTES # BLD AUTO: 1.45 K/UL
LYMPHOCYTES NFR BLD AUTO: 25.7 %
MAN DIFF?: NORMAL
MCHC RBC-ENTMCNC: 30.8 PG
MCHC RBC-ENTMCNC: 31.6 GM/DL
MCV RBC AUTO: 97.5 FL
MONOCYTES # BLD AUTO: 0.54 K/UL
MONOCYTES NFR BLD AUTO: 9.6 %
NEUTROPHILS # BLD AUTO: 3.44 K/UL
NEUTROPHILS NFR BLD AUTO: 61 %
PLATELET # BLD AUTO: 216 K/UL
POTASSIUM SERPL-SCNC: 5.1 MMOL/L
PROT SERPL-MCNC: 7.8 G/DL
RBC # BLD: 4.77 M/UL
RBC # FLD: 13.9 %
SODIUM SERPL-SCNC: 143 MMOL/L
TRIGL SERPL-MCNC: 126 MG/DL
TSH SERPL-ACNC: 3.25 UIU/ML
WBC # FLD AUTO: 5.64 K/UL

## 2019-08-13 ENCOUNTER — RX RENEWAL (OUTPATIENT)
Age: 74
End: 2019-08-13

## 2019-08-29 ENCOUNTER — RX RENEWAL (OUTPATIENT)
Age: 74
End: 2019-08-29

## 2019-09-02 ENCOUNTER — RX RENEWAL (OUTPATIENT)
Age: 74
End: 2019-09-02

## 2019-09-06 ENCOUNTER — APPOINTMENT (OUTPATIENT)
Dept: INTERNAL MEDICINE | Facility: CLINIC | Age: 74
End: 2019-09-06
Payer: MEDICARE

## 2019-09-06 VITALS
DIASTOLIC BLOOD PRESSURE: 80 MMHG | WEIGHT: 198 LBS | SYSTOLIC BLOOD PRESSURE: 125 MMHG | OXYGEN SATURATION: 98 % | HEART RATE: 72 BPM | BODY MASS INDEX: 28.35 KG/M2 | HEIGHT: 70 IN

## 2019-09-06 PROCEDURE — 90750 HZV VACC RECOMBINANT IM: CPT | Mod: GY

## 2019-09-06 PROCEDURE — 36415 COLL VENOUS BLD VENIPUNCTURE: CPT

## 2019-09-06 PROCEDURE — 90471 IMMUNIZATION ADMIN: CPT | Mod: GY

## 2019-09-06 PROCEDURE — 99213 OFFICE O/P EST LOW 20 MIN: CPT | Mod: 25

## 2019-09-06 NOTE — PHYSICAL EXAM
[General Appearance - In No Acute Distress] : in no acute distress [] : no respiratory distress [Respiration, Rhythm And Depth] : normal respiratory rhythm and effort [Heart Rate And Rhythm] : heart rate was normal and rhythm regular [Auscultation Breath Sounds / Voice Sounds] : lungs were clear to auscultation bilaterally [Affect] : the affect was normal [Mood] : the mood was normal

## 2019-09-06 NOTE — HEALTH RISK ASSESSMENT
[Yes] : Yes [2 - 4 times a month (2 pts)] : 2-4 times a month (2 points) [1 or 2 (0 pts)] : 1 or 2 (0 points) [Never (0 pts)] : Never (0 points) [No] : In the past 12 months have you used drugs other than those required for medical reasons? No [Audit-CScore] : 2 points

## 2019-09-06 NOTE — ASSESSMENT
[FreeTextEntry1] : Labs will be sent out/ further recommendations will be made based on lab results. Patient advised to continue present medications with diet/exercise and specialist followup.Shingrix #2 given without reaction. Patient will return to the office in 3-4months for CP\par \par colonoscopy was 10/2018-next in 5 years\par vaccines are UTD\par specialists include\par 1. cardiology-Dr. Matias\par 2. ophthalmology-Dr. Crawford\par 3. urology yearly-Dr. Lowe-may stop going and just have DALJIT and PSA here Qyear\par 4. podiatry on  a p.r.n. basis-Dr. Mercado/Dr. Kim\par 5.hematology-Dr. Liu-no need for follow up\par 6.  Vascular-Dr. Lennon-no need for follow up\par HIV screen=4/2018\par Hepatitis C screening=4/2018\par Echo 12/2018

## 2019-09-09 ENCOUNTER — RESULT REVIEW (OUTPATIENT)
Age: 74
End: 2019-09-09

## 2019-09-09 LAB
ALBUMIN SERPL ELPH-MCNC: 4.5 G/DL
ALP BLD-CCNC: 61 U/L
ALT SERPL-CCNC: 25 U/L
ANION GAP SERPL CALC-SCNC: 12 MMOL/L
AST SERPL-CCNC: 23 U/L
BASOPHILS # BLD AUTO: 0.06 K/UL
BASOPHILS NFR BLD AUTO: 1 %
BILIRUB SERPL-MCNC: 0.6 MG/DL
BUN SERPL-MCNC: 17 MG/DL
CALCIUM SERPL-MCNC: 9.3 MG/DL
CHLORIDE SERPL-SCNC: 105 MMOL/L
CHOLEST SERPL-MCNC: 136 MG/DL
CHOLEST/HDLC SERPL: 2.7 RATIO
CO2 SERPL-SCNC: 22 MMOL/L
CREAT SERPL-MCNC: 1.11 MG/DL
EOSINOPHIL # BLD AUTO: 0.18 K/UL
EOSINOPHIL NFR BLD AUTO: 2.9 %
ESTIMATED AVERAGE GLUCOSE: 123 MG/DL
GLUCOSE SERPL-MCNC: 95 MG/DL
HBA1C MFR BLD HPLC: 5.9 %
HCT VFR BLD CALC: 42.3 %
HDLC SERPL-MCNC: 50 MG/DL
HGB BLD-MCNC: 13.4 G/DL
IMM GRANULOCYTES NFR BLD AUTO: 0.3 %
LDLC SERPL CALC-MCNC: 70 MG/DL
LYMPHOCYTES # BLD AUTO: 1.58 K/UL
LYMPHOCYTES NFR BLD AUTO: 25.2 %
MAN DIFF?: NORMAL
MCHC RBC-ENTMCNC: 29.9 PG
MCHC RBC-ENTMCNC: 31.7 GM/DL
MCV RBC AUTO: 94.4 FL
MONOCYTES # BLD AUTO: 0.62 K/UL
MONOCYTES NFR BLD AUTO: 9.9 %
NEUTROPHILS # BLD AUTO: 3.82 K/UL
NEUTROPHILS NFR BLD AUTO: 60.7 %
PLATELET # BLD AUTO: 198 K/UL
POTASSIUM SERPL-SCNC: 4.6 MMOL/L
PROT SERPL-MCNC: 7.2 G/DL
RBC # BLD: 4.48 M/UL
RBC # FLD: 13.8 %
SODIUM SERPL-SCNC: 139 MMOL/L
TRIGL SERPL-MCNC: 82 MG/DL
TSH SERPL-ACNC: 3.63 UIU/ML
WBC # FLD AUTO: 6.28 K/UL

## 2019-10-24 ENCOUNTER — APPOINTMENT (OUTPATIENT)
Dept: INTERNAL MEDICINE | Facility: CLINIC | Age: 74
End: 2019-10-24
Payer: MEDICARE

## 2019-10-24 PROCEDURE — 90662 IIV NO PRSV INCREASED AG IM: CPT

## 2019-10-24 PROCEDURE — G0008: CPT

## 2019-12-02 ENCOUNTER — APPOINTMENT (OUTPATIENT)
Dept: INTERNAL MEDICINE | Facility: CLINIC | Age: 74
End: 2019-12-02
Payer: MEDICARE

## 2019-12-02 VITALS
HEART RATE: 59 BPM | WEIGHT: 198 LBS | DIASTOLIC BLOOD PRESSURE: 80 MMHG | SYSTOLIC BLOOD PRESSURE: 123 MMHG | BODY MASS INDEX: 28.35 KG/M2 | RESPIRATION RATE: 14 BRPM | HEIGHT: 70 IN

## 2019-12-02 PROCEDURE — G0444 DEPRESSION SCREEN ANNUAL: CPT | Mod: 59

## 2019-12-02 PROCEDURE — G0442 ANNUAL ALCOHOL SCREEN 15 MIN: CPT | Mod: 59

## 2019-12-02 PROCEDURE — G0439: CPT

## 2019-12-02 PROCEDURE — 36415 COLL VENOUS BLD VENIPUNCTURE: CPT

## 2019-12-02 NOTE — PHYSICAL EXAM
[Sclera] : the sclera and conjunctiva were normal [General Appearance - Alert] : alert [General Appearance - In No Acute Distress] : in no acute distress [Extraocular Movements] : extraocular movements were intact [PERRL With Normal Accommodation] : pupils were equal in size, round, and reactive to light [Oropharynx] : the oropharynx was normal [Outer Ear] : the ears and nose were normal in appearance [Neck Appearance] : the appearance of the neck was normal [Neck Cervical Mass (___cm)] : no neck mass was observed [Thyroid Diffuse Enlargement] : the thyroid was not enlarged [Thyroid Nodule] : there were no palpable thyroid nodules [Jugular Venous Distention Increased] : there was no jugular-venous distention [Auscultation Breath Sounds / Voice Sounds] : lungs were clear to auscultation bilaterally [Heart Sounds] : normal S1 and S2 [Heart Rate And Rhythm] : heart rate was normal and rhythm regular [Heart Sounds Gallop] : no gallops [Murmurs] : no murmurs [Arterial Pulses Carotid] : carotid pulses were normal with no bruits [Heart Sounds Pericardial Friction Rub] : no pericardial rub [Abdominal Aorta] : the abdominal aorta was normal [Full Pulse] : the pedal pulses are present [Arterial Pulses Femoral] : femoral pulses were normal without bruits [Edema] : there was no peripheral edema [Veins - Varicosity Changes] : there were no varicosital changes [Bowel Sounds] : normal bowel sounds [Abdomen Soft] : soft [Abdomen Tenderness] : non-tender [Abdomen Mass (___ Cm)] : no abdominal mass palpated [Cervical Lymph Nodes Enlarged Posterior Bilaterally] : posterior cervical [Cervical Lymph Nodes Enlarged Anterior Bilaterally] : anterior cervical [Supraclavicular Lymph Nodes Enlarged Bilaterally] : supraclavicular [Axillary Lymph Nodes Enlarged Bilaterally] : axillary [Femoral Lymph Nodes Enlarged Bilaterally] : femoral [Inguinal Lymph Nodes Enlarged Bilaterally] : inguinal [No Spinal Tenderness] : no spinal tenderness [Abnormal Walk] : normal gait [Musculoskeletal - Swelling] : no joint swelling seen [Motor Tone] : muscle strength and tone were normal [Nail Clubbing] : no clubbing  or cyanosis of the fingernails [Skin Color & Pigmentation] : normal skin color and pigmentation [Skin Turgor] : normal skin turgor [] : no rash [Cranial Nerves] : cranial nerves 2-12 were intact [No Focal Deficits] : no focal deficits [Oriented To Time, Place, And Person] : oriented to person, place, and time [Impaired Insight] : insight and judgment were intact [Affect] : the affect was normal [FreeTextEntry1] : via urology

## 2019-12-02 NOTE — ASSESSMENT
[FreeTextEntry1] : 74-year-old male with known CAD via CTA remains asymptomatic with controlled hypertension and hyperlipidemia on present medications.Patient is clinically euthyroid on thyroid replacement therapy.\par \par The patient is status post DVT with PE June 2017 and on lower dose Eliquis 2.5 mg BID since on recommendation of cardiology despite negative hypercoagulable workup\par \par To continue present medications\par Continue to diet and exercise encouraged\par \par Followup with specialists as scheduled\par Colonoscopy October 2018 with followup in 5 years\par \par Influenza vaccine already received\par Up to date with all of the vaccines\par \par venipuncture done at today's office visit\par \par Followup in 3-4 months

## 2019-12-02 NOTE — HEALTH RISK ASSESSMENT
[No falls in past year] : Patient reported no falls in the past year [0] : 2) Feeling down, depressed, or hopeless: Not at all (0) [None] : None [With Significant Other] : lives with significant other [Retired] : retired [# of Members in Household ___] :  household currently consist of [unfilled] member(s) [] :  [# Of Children ___] : has [unfilled] children [College] : College [Sexually Active] : sexually active [Feels Safe at Home] : Feels safe at home [Fully functional (using the telephone, shopping, preparing meals, housekeeping, doing laundry, using] : Fully functional and needs no help or supervision to perform IADLs (using the telephone, shopping, preparing meals, housekeeping, doing laundry, using transportation, managing medications and managing finances) [Fully functional (bathing, dressing, toileting, transferring, walking, feeding)] : Fully functional (bathing, dressing, toileting, transferring, walking, feeding) [Smoke Detector] : smoke detector [Guns at Home] : guns at home [Carbon Monoxide Detector] : carbon monoxide detector [Seat Belt] :  uses seat belt [Sunscreen] : uses sunscreen [Discussed at today's visit] : Advance Directives Discussed at today's visit [Name: ___] : Health Care Proxy's Name: [unfilled]  [Designated Healthcare Proxy] : Designated healthcare proxy [Excellent] : ~his/her~  mood as  excellent [Very Good] : ~his/her~ current health as very good [Yes] : Yes [2 - 4 times a month (2 pts)] : 2-4 times a month (2 points) [1 or 2 (0 pts)] : 1 or 2 (0 points) [Never (0 pts)] : Never (0 points) [No] : In the past 12 months have you used drugs other than those required for medical reasons? No [Reviewed no changes] : Reviewed no changes [] : No [Audit-CScore] : 2 [de-identified] : exercises [de-identified] : good [GES3Mhpxy] : 0 [Change in mental status noted] : No change in mental status noted [Reports changes in hearing] : Reports no changes in hearing [Reports changes in vision] : Reports no changes in vision [Reports changes in dental health] : Reports no changes in dental health [FreeTextEntry2] :  [de-identified] : locked and safe [AdvancecareDate] : 12/19

## 2019-12-02 NOTE — HISTORY OF PRESENT ILLNESS
[FreeTextEntry1] : 74-year-old male with history of CAD seen on CTA as well as hypertension on valsartan and metoprolol and hyperlipidemia on Livalo presents for his yearly physical.\par \par Patient also with history of hypothyroidism on thyroid replacement therapy.\par \par  June 2017 he had an acute leg DVT with associated pulmonary emboli. This occurred after the patient had an ankle injury. Otherwise there was no preceding incident to cause his DVT.\par The patient has been on Eliquis since.\par He did see hematology with a negative hypercoagulable workup. Hematology recommended 3 months of anticoagulation.\par Also being followed by vascular who felt the patient should be on anticoagulation for 6 months.\par .The patient continues on Eliquis on the recommendation of cardiology secondary to his DVT being essentially unprovoked and the patient's brother having had a history of multiple clots. Therefore, concern for some familial hypercoagulability, despite negative hypercoagulable workup.\par Patient continues on Eliquis 2.5 mg b.i.d.\par \par Patient generally feeling well without chest pain, palpitations or shortness of breath.\par He follows with cardiology every 6 months.\par Stress echocardiogram December 2018 = normal\par \par He sees urology once a year.\par \par On direct questioning patient without any complaints.\par The patient has made some good lifestyle changes mainly with dietary changes.

## 2019-12-02 NOTE — COUNSELING
[Good understanding] : Patient has a good understanding of lifestyle changes and the steps needed to achieve self management goals [None] : None [de-identified] : Continue diet and exercise

## 2019-12-03 ENCOUNTER — RESULT REVIEW (OUTPATIENT)
Age: 74
End: 2019-12-03

## 2019-12-03 LAB
ALBUMIN SERPL ELPH-MCNC: 4.7 G/DL
ALP BLD-CCNC: 63 U/L
ALT SERPL-CCNC: 20 U/L
ANION GAP SERPL CALC-SCNC: 14 MMOL/L
APPEARANCE: CLEAR
AST SERPL-CCNC: 18 U/L
BACTERIA: NEGATIVE
BASOPHILS # BLD AUTO: 0.05 K/UL
BASOPHILS NFR BLD AUTO: 0.8 %
BILIRUB SERPL-MCNC: 0.6 MG/DL
BILIRUBIN URINE: NEGATIVE
BLOOD URINE: NORMAL
BUN SERPL-MCNC: 18 MG/DL
CALCIUM SERPL-MCNC: 9.4 MG/DL
CHLORIDE SERPL-SCNC: 104 MMOL/L
CHOLEST SERPL-MCNC: 147 MG/DL
CHOLEST/HDLC SERPL: 3.1 RATIO
CO2 SERPL-SCNC: 22 MMOL/L
COLOR: NORMAL
CREAT SERPL-MCNC: 1.16 MG/DL
EOSINOPHIL # BLD AUTO: 0.14 K/UL
EOSINOPHIL NFR BLD AUTO: 2.2 %
ESTIMATED AVERAGE GLUCOSE: 120 MG/DL
GLUCOSE QUALITATIVE U: NEGATIVE
GLUCOSE SERPL-MCNC: 93 MG/DL
HBA1C MFR BLD HPLC: 5.8 %
HCT VFR BLD CALC: 45.4 %
HDLC SERPL-MCNC: 47 MG/DL
HGB BLD-MCNC: 14.6 G/DL
HYALINE CASTS: 0 /LPF
IMM GRANULOCYTES NFR BLD AUTO: 0.3 %
KETONES URINE: NEGATIVE
LDLC SERPL CALC-MCNC: 80 MG/DL
LEUKOCYTE ESTERASE URINE: NEGATIVE
LYMPHOCYTES # BLD AUTO: 1.59 K/UL
LYMPHOCYTES NFR BLD AUTO: 25.2 %
MAN DIFF?: NORMAL
MCHC RBC-ENTMCNC: 30.9 PG
MCHC RBC-ENTMCNC: 32.2 GM/DL
MCV RBC AUTO: 96 FL
MICROSCOPIC-UA: NORMAL
MONOCYTES # BLD AUTO: 0.68 K/UL
MONOCYTES NFR BLD AUTO: 10.8 %
NEUTROPHILS # BLD AUTO: 3.82 K/UL
NEUTROPHILS NFR BLD AUTO: 60.7 %
NITRITE URINE: NEGATIVE
PH URINE: 5
PLATELET # BLD AUTO: 207 K/UL
POTASSIUM SERPL-SCNC: 4.5 MMOL/L
PROT SERPL-MCNC: 7.3 G/DL
PROTEIN URINE: NEGATIVE
RBC # BLD: 4.73 M/UL
RBC # FLD: 13.7 %
RED BLOOD CELLS URINE: 0 /HPF
SODIUM SERPL-SCNC: 139 MMOL/L
SPECIFIC GRAVITY URINE: 1.01
SQUAMOUS EPITHELIAL CELLS: 0 /HPF
T4 FREE SERPL-MCNC: 1.3 NG/DL
TRIGL SERPL-MCNC: 99 MG/DL
TSH SERPL-ACNC: 2.57 UIU/ML
UROBILINOGEN URINE: NORMAL
WBC # FLD AUTO: 6.3 K/UL
WHITE BLOOD CELLS URINE: 0 /HPF

## 2020-02-10 ENCOUNTER — RX RENEWAL (OUTPATIENT)
Age: 75
End: 2020-02-10

## 2020-02-25 ENCOUNTER — RX RENEWAL (OUTPATIENT)
Age: 75
End: 2020-02-25

## 2020-02-29 ENCOUNTER — RX RENEWAL (OUTPATIENT)
Age: 75
End: 2020-02-29

## 2020-03-02 ENCOUNTER — RX RENEWAL (OUTPATIENT)
Age: 75
End: 2020-03-02

## 2020-03-06 ENCOUNTER — APPOINTMENT (OUTPATIENT)
Dept: INTERNAL MEDICINE | Facility: CLINIC | Age: 75
End: 2020-03-06
Payer: MEDICARE

## 2020-03-06 VITALS
DIASTOLIC BLOOD PRESSURE: 80 MMHG | HEART RATE: 65 BPM | WEIGHT: 198 LBS | SYSTOLIC BLOOD PRESSURE: 125 MMHG | TEMPERATURE: 97.6 F | BODY MASS INDEX: 28.35 KG/M2 | HEIGHT: 70 IN

## 2020-03-06 PROCEDURE — 99213 OFFICE O/P EST LOW 20 MIN: CPT

## 2020-03-07 NOTE — HISTORY OF PRESENT ILLNESS
[FreeTextEntry8] : Patient presents complaining of right lower lid lesion/questionable infection as of yesterday. Patient states his vision is normal, no discharge from the eye. Patient does not wear glasses or contacts. Patient has no sick symptoms including fever

## 2020-03-07 NOTE — PHYSICAL EXAM
[No Acute Distress] : no acute distress [Normal Outer Ear/Nose] : the outer ears and nose were normal in appearance [Normal Oropharynx] : the oropharynx was normal [Normal TMs] : both tympanic membranes were normal [Normal Nasal Mucosa] : the nasal mucosa was normal [No Lymphadenopathy] : no lymphadenopathy [No Respiratory Distress] : no respiratory distress  [Clear to Auscultation] : lungs were clear to auscultation bilaterally [Normal Rate] : normal rate  [Regular Rhythm] : with a regular rhythm [Normal Affect] : the affect was normal [Normal Mood] : the mood was normal [Normal Sclera/Conjunctiva] : normal sclera/conjunctiva [PERRL] : pupils equal round and reactive to light [EOMI] : extraocular movements intact [de-identified] : +Stye like lesion right mid lower lid

## 2020-03-07 NOTE — ASSESSMENT
[FreeTextEntry1] : TobraDex ophthalmic suspension/erythromycin ointment, compresses advised. Patient will be referred to ophthalmology if no improvement. Patient will call with progress and return to the office as scheduled\par \par \par Dr. Fairbanks was present in office building while I examined patient\par

## 2020-06-26 ENCOUNTER — APPOINTMENT (OUTPATIENT)
Dept: INTERNAL MEDICINE | Facility: CLINIC | Age: 75
End: 2020-06-26
Payer: MEDICARE

## 2020-06-26 VITALS — BODY MASS INDEX: 28.35 KG/M2 | HEART RATE: 69 BPM | TEMPERATURE: 97.7 F | WEIGHT: 198 LBS | HEIGHT: 70 IN

## 2020-06-26 VITALS
DIASTOLIC BLOOD PRESSURE: 80 MMHG | SYSTOLIC BLOOD PRESSURE: 125 MMHG | HEART RATE: 69 BPM | WEIGHT: 198 LBS | BODY MASS INDEX: 28.41 KG/M2

## 2020-06-26 DIAGNOSIS — H44.001 UNSPECIFIED PURULENT ENDOPHTHALMITIS, RIGHT EYE: ICD-10-CM

## 2020-06-26 PROCEDURE — 36415 COLL VENOUS BLD VENIPUNCTURE: CPT

## 2020-06-26 PROCEDURE — 99213 OFFICE O/P EST LOW 20 MIN: CPT | Mod: 25

## 2020-06-26 RX ORDER — ERYTHROMYCIN 5 MG/G
5 OINTMENT OPHTHALMIC
Qty: 1 | Refills: 0 | Status: DISCONTINUED | COMMUNITY
Start: 2020-03-06 | End: 2020-06-26

## 2020-06-26 RX ORDER — TOBRAMYCIN AND DEXAMETHASONE 3; 1 MG/ML; MG/ML
0.3-0.1 SUSPENSION/ DROPS OPHTHALMIC EVERY 4 HOURS
Qty: 1 | Refills: 0 | Status: DISCONTINUED | COMMUNITY
Start: 2020-03-06 | End: 2020-06-26

## 2020-06-26 NOTE — HISTORY OF PRESENT ILLNESS
[FreeTextEntry1] : Patient presents for followup on hypertension/hyperlipidemia/hypothyroid. Patient is currently fasting for today's labs and offers no acute complaints. Patient is currently on Atacand/Toprol for hypertension, on Livalo for hyperlipidemia and is on Synthroid for his hypothyroidism.\par -agrees to COVID ab testing\par

## 2020-06-26 NOTE — ASSESSMENT
[FreeTextEntry1] : Venipuncture done in our office, Labs sent out/ further recommendations will be made based on lab results. Patient advised to continue present medications with diet/exercise and specialist followup. Patient will return to the office in 3-4months\par \par colonoscopy was 10/2018-next in 5 years\par vaccines are UTD\par specialists include\par 1. cardiology-Dr. Matias\par 2. ophthalmology-Dr. Crawford\par 3. urology yearly-Dr. Lowe-may stop going and just have DALJIT and PSA here Qyear\par 4. podiatry on  a p.r.n. basis-Dr. Mercado/Dr. Kim\par 5.hematology-Dr. Liu-no need for follow up\par 6.  Vascular-Dr. Lennon-no need for follow up\par HIV screen=4/2018\par Hepatitis C screening=4/2018\par Echo stress test 12/2018

## 2020-06-27 LAB
ALBUMIN SERPL ELPH-MCNC: 4.7 G/DL
ALP BLD-CCNC: 61 U/L
ALT SERPL-CCNC: 23 U/L
ANION GAP SERPL CALC-SCNC: 14 MMOL/L
AST SERPL-CCNC: 23 U/L
BASOPHILS # BLD AUTO: 0.04 K/UL
BASOPHILS NFR BLD AUTO: 0.6 %
BILIRUB SERPL-MCNC: 0.6 MG/DL
BUN SERPL-MCNC: 21 MG/DL
CALCIUM SERPL-MCNC: 9.4 MG/DL
CHLORIDE SERPL-SCNC: 106 MMOL/L
CHOLEST SERPL-MCNC: 119 MG/DL
CHOLEST/HDLC SERPL: 2.3 RATIO
CO2 SERPL-SCNC: 21 MMOL/L
CREAT SERPL-MCNC: 1.18 MG/DL
EOSINOPHIL # BLD AUTO: 0.08 K/UL
EOSINOPHIL NFR BLD AUTO: 1.2 %
ESTIMATED AVERAGE GLUCOSE: 117 MG/DL
GLUCOSE SERPL-MCNC: 92 MG/DL
HBA1C MFR BLD HPLC: 5.7 %
HCT VFR BLD CALC: 46.4 %
HDLC SERPL-MCNC: 53 MG/DL
HGB BLD-MCNC: 14.2 G/DL
IMM GRANULOCYTES NFR BLD AUTO: 0.2 %
LDLC SERPL CALC-MCNC: 54 MG/DL
LYMPHOCYTES # BLD AUTO: 1.4 K/UL
LYMPHOCYTES NFR BLD AUTO: 21.3 %
MAN DIFF?: NORMAL
MCHC RBC-ENTMCNC: 30.5 PG
MCHC RBC-ENTMCNC: 30.6 GM/DL
MCV RBC AUTO: 99.6 FL
MONOCYTES # BLD AUTO: 0.57 K/UL
MONOCYTES NFR BLD AUTO: 8.7 %
NEUTROPHILS # BLD AUTO: 4.46 K/UL
NEUTROPHILS NFR BLD AUTO: 68 %
PLATELET # BLD AUTO: 202 K/UL
POTASSIUM SERPL-SCNC: 4.6 MMOL/L
PROT SERPL-MCNC: 7.1 G/DL
RBC # BLD: 4.66 M/UL
RBC # FLD: 13.8 %
SARS-COV-2 IGG SERPL IA-ACNC: 0.11 INDEX
SARS-COV-2 IGG SERPL QL IA: NEGATIVE
SODIUM SERPL-SCNC: 141 MMOL/L
TRIGL SERPL-MCNC: 62 MG/DL
TSH SERPL-ACNC: 1.79 UIU/ML
WBC # FLD AUTO: 6.56 K/UL

## 2020-08-10 ENCOUNTER — RX RENEWAL (OUTPATIENT)
Age: 75
End: 2020-08-10

## 2020-08-24 ENCOUNTER — RX RENEWAL (OUTPATIENT)
Age: 75
End: 2020-08-24

## 2020-08-29 ENCOUNTER — RX RENEWAL (OUTPATIENT)
Age: 75
End: 2020-08-29

## 2020-10-13 ENCOUNTER — APPOINTMENT (OUTPATIENT)
Dept: INTERNAL MEDICINE | Facility: CLINIC | Age: 75
End: 2020-10-13

## 2020-10-27 ENCOUNTER — APPOINTMENT (OUTPATIENT)
Dept: INTERNAL MEDICINE | Facility: CLINIC | Age: 75
End: 2020-10-27
Payer: MEDICARE

## 2020-10-27 VITALS — TEMPERATURE: 97.8 F

## 2020-10-27 PROCEDURE — G0008: CPT

## 2020-10-27 PROCEDURE — 90662 IIV NO PRSV INCREASED AG IM: CPT

## 2020-12-08 ENCOUNTER — APPOINTMENT (OUTPATIENT)
Dept: INTERNAL MEDICINE | Facility: CLINIC | Age: 75
End: 2020-12-08
Payer: MEDICARE

## 2020-12-08 VITALS
DIASTOLIC BLOOD PRESSURE: 80 MMHG | TEMPERATURE: 95.6 F | HEIGHT: 70 IN | WEIGHT: 201 LBS | HEART RATE: 74 BPM | BODY MASS INDEX: 28.77 KG/M2 | RESPIRATION RATE: 13 BRPM | SYSTOLIC BLOOD PRESSURE: 126 MMHG

## 2020-12-08 DIAGNOSIS — H00.019 HORDEOLUM EXTERNUM UNSPECIFIED EYE, UNSPECIFIED EYELID: ICD-10-CM

## 2020-12-08 DIAGNOSIS — Z11.59 ENCOUNTER FOR SCREENING FOR OTHER VIRAL DISEASES: ICD-10-CM

## 2020-12-08 DIAGNOSIS — N28.1 CYST OF KIDNEY, ACQUIRED: ICD-10-CM

## 2020-12-08 DIAGNOSIS — Z92.29 PERSONAL HISTORY OF OTHER DRUG THERAPY: ICD-10-CM

## 2020-12-08 DIAGNOSIS — Z23 ENCOUNTER FOR IMMUNIZATION: ICD-10-CM

## 2020-12-08 PROCEDURE — G0438: CPT

## 2020-12-08 PROCEDURE — 36415 COLL VENOUS BLD VENIPUNCTURE: CPT

## 2020-12-08 PROCEDURE — G0442 ANNUAL ALCOHOL SCREEN 15 MIN: CPT | Mod: 59

## 2020-12-08 NOTE — ASSESSMENT
[FreeTextEntry1] : 75-year-old male with known CAD via CTA remains asymptomatic with controlled hypertension and hyperlipidemia on present medications.Patient is clinically euthyroid on thyroid replacement therapy.\par \par The patient is status post DVT with PE June 2017 and on lower dose Eliquis 2.5 mg BID since on recommendation of cardiology despite negative hypercoagulable workup\par \par To continue present medications\par Continue to diet and exercise encouraged\par \par Followup with specialists as scheduled\par Colonoscopy October 2018 with followup in 5 years\par \par Influenza vaccine already received\par Up to date with all of the vaccines\par \par venipuncture done at today's office visit\par \par Followup in 3-4 months

## 2020-12-08 NOTE — COUNSELING
[None] : None [Good understanding] : Patient has a good understanding of lifestyle changes and the steps needed to achieve self management goals [de-identified] : Continue diet and exercise

## 2020-12-08 NOTE — HEALTH RISK ASSESSMENT
Chief Complaint: scrotal cellulitis    Subjective:  Looks dramatically better this am.  Sitting in chair eating breakfast.  No temps, scrotal pain gone.  Working with PT.  Doesn't want to go to SNF.  Thinks he will be able to go home tomorrow with home PT.  If able to transfer adequately and do steps, will plan d/c tomorrow.  Otherwise [facility] would be \"plan B\".     ASSESSMENT/PLAN:    Active Hospital Problems    Cellulitis, scrotum: planning to stop antibiotics today, observe overnight.      Panniculitis      *Sleep apnea: will need to follow up with Pulmonary as outpatient for management and likely needs new equipment.      General weakness: doing better, as above.      Bradycardia      Sinus pause      Wheezing      Wound of left leg      Lymphedema: concerned that weight is up today vs yesterday but doesn't seem more edematous.  Bumex increased to BID yesterday.  Will resume lymphedema pumps at home.      HTN (hypertension): better control.      Depressive disorder, not elsewhere classified      Morbid obesity (CMS/HCC)       See orders for additional evaluation and management.     Past Medical, Surgical, Family Histories, Medication list and allergies reviewed and accepted.  Nursing and Therapy notes reviewed and accepted.    Problem list reviewed and accepted.      Vital 24 Hour Range Most Recent Value   Temperature Temp  Min: 97.7 °F (36.5 °C)  Max: 98.1 °F (36.7 °C) 97.8 °F (36.6 °C)   Pulse Pulse  Min: 69  Max: 83 69   Respiratory Resp  Min: 20  Max: 20 20   Blood Pressure BP  Min: 122/68  Max: 130/68 130/68   Pulse Oximetry SpO2  Min: 92 %  Max: 98 % 98 %   Art. BP No Data Recorded     O2 O2 Flow Rate (L/min)  Av L/min  Min: 4 L/min   Min taken time: 17 0300  Max: 4 L/min   Max taken time: 17 0300       Vital Most Recent Value First Value   Weight (!) 199.5 kg Weight: (!) 207.7 kg   Height 5' 6\" (167.6 cm) Height: 5' 6\" (167.6 cm)   BMI 71.14 N/A     Weight over the past 48 Hours: Patient  Vitals for the past 48 hrs:   Weight   11/15/17 0357 (!) 194 kg   11/16/17 0522 (!) 199.5 kg       Last Stool Occurrence: 1 (11/15/17 1445)    Intake/Output:      Intake/Output Summary (Last 24 hours) at 11/16/17 0822  Last data filed at 11/15/17 2200   Gross per 24 hour   Intake             1000 ml   Output                0 ml   Net             1000 ml       Blood Sugar review:     Recent Labs  Lab 11/15/17  0959 11/15/17  1544 11/15/17  2208 11/16/17  0734   GLUCOSE BEDSIDE 168* 148* 133* 141*         Physical Exam:    GENERAL: alert, in no acute distress  SKIN: warm with normal turgor  HEAD: atraumatic and normocephalic  EYES: eyelids and conjunctiva appear normal, no excessive tearing or discharge  NECK: supple with no significant adenopathy, no thyromegaly  LUNGS: clear to auscultation, no wheezing or rhonchi noted  HEART: regular rhythm, no murmur present  ABDOMEN:   Soft, nontender, no masses, no distention, bowel sounds present  EXTREMITIES: no clubbing, cyanosis, edema   NEUROLOGIC:  No focal deficits    Medications/Infusions:  Scheduled:   • bumetanide  2 mg Oral BID   • valsartan  160 mg Oral Daily   • fluticasone  2 spray Each Nare Daily   • nystatin   Topical TID   • micafungin (MYCAMINE) IVPB  100 mg Intravenous Daily   • albuterol  2.5 mg Nebulization TID Resp   • guaiFENesin-DM  2 tablet Oral 2 times per day   • enoxaparin (LOVENOX) injection  40 mg Subcutaneous 2 times per day   • ammonium lactate  1 application Topical TID   • piperacillin-tazobactam (ZOSYN) 30 minute duration IVPB  3.375 g Intravenous 4 times per day   • allopurinol  300 mg Oral Daily   • amLODIPine  5 mg Oral Daily   • aspirin  81 mg Oral Daily   • atorvastatin  10 mg Oral Daily   • ketoconazole   Topical 2 times per day   • venlafaxine XR  75 mg Oral Daily with breakfast   • sodium chloride (PF)  2 mL Injection 2 times per day         Laboratory Results:      Recent Labs  Lab 11/16/17  0618 11/15/17  0640 11/14/17  1050   11/09/17  0910   SODIUM 138 141 138  < > 139   POTASSIUM 3.8 4.2 4.3  < > 4.2   CHLORIDE 100 101 98  < > 106   CO2 31 33* 32  < > 34*   GLUCOSE 141* 147* 222*  < > 135*   BUN 21* 24* 17  < > 15   CREATININE 0.88 0.94 0.87  < > 0.82   CALCIUM 8.4 8.6 9.0  < > 8.4   ALBUMIN  --   --   --   --  2.6*   BILIRUBIN  --   --   --   --  0.4   AST  --   --   --   --  20   < > = values in this interval not displayed.    Recent Labs  Lab 11/11/17  0630 11/09/17  0910   WBC 6.7 7.5   RBC 4.33* 4.10*   HCT 40.6 38.6*   HGB 12.9* 12.1*    231       Recent Labs  Lab 11/09/17  0910   TSH 0.830     Urinalysis  No results found       [Yes] : Yes [1 or 2 (0 pts)] : 1 or 2 (0 points) [Never (0 pts)] : Never (0 points) [No] : In the past 12 months have you used drugs other than those required for medical reasons? No [No falls in past year] : Patient reported no falls in the past year [0] : 2) Feeling down, depressed, or hopeless: Not at all (0) [None] : None [With Significant Other] : lives with significant other [# of Members in Household ___] :  household currently consist of [unfilled] member(s) [Retired] : retired [College] : College [] :  [# Of Children ___] : has [unfilled] children [Sexually Active] : sexually active [Feels Safe at Home] : Feels safe at home [Fully functional (bathing, dressing, toileting, transferring, walking, feeding)] : Fully functional (bathing, dressing, toileting, transferring, walking, feeding) [Fully functional (using the telephone, shopping, preparing meals, housekeeping, doing laundry, using] : Fully functional and needs no help or supervision to perform IADLs (using the telephone, shopping, preparing meals, housekeeping, doing laundry, using transportation, managing medications and managing finances) [Smoke Detector] : smoke detector [Carbon Monoxide Detector] : carbon monoxide detector [Guns at Home] : guns at home [Seat Belt] :  uses seat belt [Sunscreen] : uses sunscreen [Reviewed no changes] : Reviewed no changes [Discussed at today's visit] : Advance Directives Discussed at today's visit [Designated Healthcare Proxy] : Designated healthcare proxy [Name: ___] : Health Care Proxy's Name: [unfilled]  [Very Good] : ~his/her~  mood as very good [2 - 4 times a month (2 pts)] : 2-4 times a month (2 points) [] : No [Audit-CScore] : 2 [de-identified] : exercises [de-identified] : good [OYI2Spoax] : 0 [Change in mental status noted] : No change in mental status noted [Reports changes in hearing] : Reports no changes in hearing [Reports changes in vision] : Reports no changes in vision [Reports changes in dental health] : Reports no changes in dental health [FreeTextEntry2] :  [de-identified] : locked and safe [AdvancecareDate] : 12/20

## 2020-12-08 NOTE — HISTORY OF PRESENT ILLNESS
[FreeTextEntry1] : 75-year-old male with history of CAD seen on CTA as well as hypertension on valsartan and metoprolol and hyperlipidemia on Livalo presents for his yearly physical.\par \par Patient also with history of hypothyroidism on thyroid replacement therapy.\par \par  June 2017 he had an acute leg DVT with associated pulmonary emboli. This occurred after the patient had an ankle injury. Otherwise there was no preceding incident to cause his DVT.\par The patient has been on Eliquis since.\par He did see hematology with a negative hypercoagulable workup. Hematology recommended 3 months of anticoagulation.\par Also being followed by vascular who felt the patient should be on anticoagulation for 6 months.\par .The patient continues on Eliquis on the recommendation of cardiology secondary to his DVT being essentially unprovoked and the patient's brother having had a history of multiple clots. Therefore, concern for some familial hypercoagulability, despite negative hypercoagulable workup.\par Patient continues on Eliquis 2.5 mg b.i.d.\par \par Patient generally feeling well without chest pain, palpitations or shortness of breath.\par He follows with cardiology every 6 months.\par Stress echocardiogram December 2018 = normal\par \par He sees urology once a year.\par \par On direct questioning patient without any complaints.\par The patient has made some good lifestyle changes mainly with dietary changes.

## 2020-12-09 LAB
ALBUMIN SERPL ELPH-MCNC: 4.7 G/DL
ALP BLD-CCNC: 74 U/L
ALT SERPL-CCNC: 19 U/L
ANION GAP SERPL CALC-SCNC: 15 MMOL/L
APPEARANCE: CLEAR
AST SERPL-CCNC: 19 U/L
BACTERIA: NEGATIVE
BASOPHILS # BLD AUTO: 0.04 K/UL
BASOPHILS NFR BLD AUTO: 0.5 %
BILIRUB SERPL-MCNC: 0.4 MG/DL
BILIRUBIN URINE: NEGATIVE
BLOOD URINE: NEGATIVE
BUN SERPL-MCNC: 22 MG/DL
CALCIUM SERPL-MCNC: 9.8 MG/DL
CHLORIDE SERPL-SCNC: 102 MMOL/L
CHOLEST SERPL-MCNC: 147 MG/DL
CO2 SERPL-SCNC: 22 MMOL/L
COLOR: YELLOW
CREAT SERPL-MCNC: 1.31 MG/DL
EOSINOPHIL # BLD AUTO: 0.18 K/UL
EOSINOPHIL NFR BLD AUTO: 2.2 %
ESTIMATED AVERAGE GLUCOSE: 123 MG/DL
GLUCOSE QUALITATIVE U: NEGATIVE
GLUCOSE SERPL-MCNC: 92 MG/DL
HBA1C MFR BLD HPLC: 5.9 %
HCT VFR BLD CALC: 46.3 %
HDLC SERPL-MCNC: 46 MG/DL
HGB BLD-MCNC: 14.9 G/DL
HYALINE CASTS: 1 /LPF
IMM GRANULOCYTES NFR BLD AUTO: 0.6 %
KETONES URINE: NEGATIVE
LDLC SERPL CALC-MCNC: 85 MG/DL
LEUKOCYTE ESTERASE URINE: NEGATIVE
LYMPHOCYTES # BLD AUTO: 2 K/UL
LYMPHOCYTES NFR BLD AUTO: 24.6 %
MAN DIFF?: NORMAL
MCHC RBC-ENTMCNC: 31 PG
MCHC RBC-ENTMCNC: 32.2 GM/DL
MCV RBC AUTO: 96.5 FL
MICROSCOPIC-UA: NORMAL
MONOCYTES # BLD AUTO: 0.69 K/UL
MONOCYTES NFR BLD AUTO: 8.5 %
NEUTROPHILS # BLD AUTO: 5.16 K/UL
NEUTROPHILS NFR BLD AUTO: 63.6 %
NITRITE URINE: NEGATIVE
NONHDLC SERPL-MCNC: 101 MG/DL
PH URINE: 6
PLATELET # BLD AUTO: 215 K/UL
POTASSIUM SERPL-SCNC: 4.8 MMOL/L
PROT SERPL-MCNC: 7.6 G/DL
PROTEIN URINE: NEGATIVE
PSA SERPL-MCNC: 1.83 NG/ML
RBC # BLD: 4.8 M/UL
RBC # FLD: 13.6 %
RED BLOOD CELLS URINE: 4 /HPF
SODIUM SERPL-SCNC: 139 MMOL/L
SPECIFIC GRAVITY URINE: 1.02
SQUAMOUS EPITHELIAL CELLS: 0 /HPF
TRIGL SERPL-MCNC: 80 MG/DL
TSH SERPL-ACNC: 3.13 UIU/ML
UROBILINOGEN URINE: NORMAL
WBC # FLD AUTO: 8.12 K/UL
WHITE BLOOD CELLS URINE: 2 /HPF

## 2020-12-21 ENCOUNTER — APPOINTMENT (OUTPATIENT)
Dept: INTERNAL MEDICINE | Facility: CLINIC | Age: 75
End: 2020-12-21
Payer: MEDICARE

## 2020-12-21 VITALS
HEIGHT: 70 IN | HEART RATE: 64 BPM | BODY MASS INDEX: 28.77 KG/M2 | SYSTOLIC BLOOD PRESSURE: 142 MMHG | RESPIRATION RATE: 14 BRPM | OXYGEN SATURATION: 98 % | WEIGHT: 201 LBS | TEMPERATURE: 97.3 F | DIASTOLIC BLOOD PRESSURE: 80 MMHG

## 2020-12-21 PROCEDURE — 99213 OFFICE O/P EST LOW 20 MIN: CPT | Mod: 25

## 2020-12-21 PROCEDURE — 36415 COLL VENOUS BLD VENIPUNCTURE: CPT

## 2020-12-21 NOTE — ASSESSMENT
[FreeTextEntry1] : Venipuncture done in our office, Labs sent out/ further recommendations will be made based on lab results. Patient advised to continue present medications with diet/exercise and specialist followup. Patient will return to the office as sched for reg check\par \par colonoscopy was 10/2018-next in 5 years\par vaccines are UTD\par specialists include\par 1. cardiology-Dr. Matias\par 2. ophthalmology-Dr. Crawford\par 3. urology yearly-Dr. Lowe-may stop going and just have DALJIT and PSA here Qyear\par 4. podiatry on  a p.r.n. basis-Dr. Mercado/Dr. Kim\par 5.hematology-Dr. Liu-no need for follow up\par 6.  Vascular-Dr. Lennon-no need for follow up\par HIV screen=4/2018\par Hepatitis C screening=4/2018\par Echo stress test 12/2018

## 2020-12-21 NOTE — HISTORY OF PRESENT ILLNESS
[FreeTextEntry1] : Patient presents for followup on hypertension/repeat labs. Patient  offers no acute complaints. Patient is currently on Atacand/Toprol for hypertension and was found to have creat of 1.3 with last labs, abnormal results d/w pt and questions answered\par -cardio increased atacand dose due to suboptimal HTN

## 2020-12-22 ENCOUNTER — NON-APPOINTMENT (OUTPATIENT)
Age: 75
End: 2020-12-22

## 2020-12-22 LAB
ANION GAP SERPL CALC-SCNC: 13 MMOL/L
BUN SERPL-MCNC: 19 MG/DL
CALCIUM SERPL-MCNC: 9.5 MG/DL
CHLORIDE SERPL-SCNC: 104 MMOL/L
CO2 SERPL-SCNC: 22 MMOL/L
CREAT SERPL-MCNC: 1.17 MG/DL
GLUCOSE SERPL-MCNC: 111 MG/DL
POTASSIUM SERPL-SCNC: 4.5 MMOL/L
SODIUM SERPL-SCNC: 138 MMOL/L

## 2021-01-13 ENCOUNTER — NON-APPOINTMENT (OUTPATIENT)
Age: 76
End: 2021-01-13

## 2021-02-04 ENCOUNTER — RX RENEWAL (OUTPATIENT)
Age: 76
End: 2021-02-04

## 2021-02-25 ENCOUNTER — RX RENEWAL (OUTPATIENT)
Age: 76
End: 2021-02-25

## 2021-03-23 NOTE — ED ADULT NURSE NOTE - ALCOHOL PRE SCREEN (AUDIT - C)
Quality 128: Preventive Care And Screening: Body Mass Index (Bmi) Screening And Follow-Up Plan: BMI is documented within normal parameters and no follow-up plan is required.
Quality 226: Preventive Care And Screening: Tobacco Use: Screening And Cessation Intervention: Patient screened for tobacco use and is an ex/non-smoker
Quality 130: Documentation Of Current Medications In The Medical Record: Current Medications Documented
Detail Level: Detailed
Quality 431: Preventive Care And Screening: Unhealthy Alcohol Use - Screening: Patient screened for unhealthy alcohol use using a single question and scores less than 2 times per year
Quality 110: Preventive Care And Screening: Influenza Immunization: Influenza Immunization Administered during Influenza season
Quality 47: Advance Care Plan: Advance care planning not documented, reason not otherwise specified.
Statement Selected

## 2021-05-03 ENCOUNTER — NON-APPOINTMENT (OUTPATIENT)
Age: 76
End: 2021-05-03

## 2021-05-04 ENCOUNTER — NON-APPOINTMENT (OUTPATIENT)
Age: 76
End: 2021-05-04

## 2021-06-24 ENCOUNTER — APPOINTMENT (OUTPATIENT)
Dept: INTERNAL MEDICINE | Facility: CLINIC | Age: 76
End: 2021-06-24
Payer: MEDICARE

## 2021-06-24 VITALS
DIASTOLIC BLOOD PRESSURE: 75 MMHG | RESPIRATION RATE: 13 BRPM | TEMPERATURE: 97.2 F | HEIGHT: 70 IN | SYSTOLIC BLOOD PRESSURE: 125 MMHG | WEIGHT: 198 LBS | BODY MASS INDEX: 28.35 KG/M2 | OXYGEN SATURATION: 98 % | HEART RATE: 61 BPM

## 2021-06-24 DIAGNOSIS — Z23 ENCOUNTER FOR IMMUNIZATION: ICD-10-CM

## 2021-06-24 PROCEDURE — 36415 COLL VENOUS BLD VENIPUNCTURE: CPT

## 2021-06-24 PROCEDURE — 99214 OFFICE O/P EST MOD 30 MIN: CPT | Mod: 25

## 2021-06-24 PROCEDURE — 90715 TDAP VACCINE 7 YRS/> IM: CPT | Mod: GY

## 2021-06-24 PROCEDURE — 90471 IMMUNIZATION ADMIN: CPT

## 2021-06-24 RX ORDER — CANDESARTAN CILEXETIL 32 MG/1
32 TABLET ORAL
Refills: 0 | Status: DISCONTINUED | COMMUNITY
Start: 2019-03-20 | End: 2021-06-24

## 2021-06-24 NOTE — HISTORY OF PRESENT ILLNESS
[FreeTextEntry1] : Patient presents for followup on hypertension/hyperlipidemia/hypothyroid. Patient is currently fasting for today's labs and offers no acute complaints. Patient is currently on Diovan HCTToprol for hypertension, on Livalo for hyperlipidemia and is on Synthroid for his hypothyroidism.\par -got covid vaccines\par \par

## 2021-06-24 NOTE — ASSESSMENT
[FreeTextEntry1] : TDAp given w/o reaction.Venipuncture done in our office, Labs sent out/ further recommendations will be made based on lab results. Patient advised to continue present medications with diet/exercise and specialist followup. Patient will return to the office in dec for CP\par \par colonoscopy was 10/2018-next in 5 years\par vaccines are UTD, +got covid vaccines\par specialists include\par 1. cardiology-Dr. Matias\par 2. ophthalmology-Dr. Crawford\par 3. urology yearly-Dr. Lowe-may stop going and just have DALJIT and PSA here Qyear\par 4. podiatry on  a p.r.n. basis-Dr. Mercado/Dr. Kim\par 5.hematology-Dr. Liu-no need for follow up\par 6.  Vascular-Dr. Lennon-no need for follow up\par HIV screen=4/2018\par Hepatitis C screening=4/2018\par Echo stress test 12/2018

## 2021-06-25 ENCOUNTER — NON-APPOINTMENT (OUTPATIENT)
Age: 76
End: 2021-06-25

## 2021-06-25 LAB
ALBUMIN SERPL ELPH-MCNC: 4.4 G/DL
ALP BLD-CCNC: 71 U/L
ALT SERPL-CCNC: 26 U/L
ANION GAP SERPL CALC-SCNC: 16 MMOL/L
AST SERPL-CCNC: 22 U/L
BASOPHILS # BLD AUTO: 0.06 K/UL
BASOPHILS NFR BLD AUTO: 0.8 %
BILIRUB SERPL-MCNC: 0.6 MG/DL
BUN SERPL-MCNC: 22 MG/DL
CALCIUM SERPL-MCNC: 9.4 MG/DL
CHLORIDE SERPL-SCNC: 100 MMOL/L
CHOLEST SERPL-MCNC: 145 MG/DL
CO2 SERPL-SCNC: 21 MMOL/L
CREAT SERPL-MCNC: 1.23 MG/DL
EOSINOPHIL # BLD AUTO: 0.29 K/UL
EOSINOPHIL NFR BLD AUTO: 3.7 %
ESTIMATED AVERAGE GLUCOSE: 126 MG/DL
GLUCOSE SERPL-MCNC: 83 MG/DL
HBA1C MFR BLD HPLC: 6 %
HCT VFR BLD CALC: 42.7 %
HDLC SERPL-MCNC: 49 MG/DL
HGB BLD-MCNC: 13.7 G/DL
IMM GRANULOCYTES NFR BLD AUTO: 0.4 %
LDLC SERPL CALC-MCNC: 77 MG/DL
LYMPHOCYTES # BLD AUTO: 1.77 K/UL
LYMPHOCYTES NFR BLD AUTO: 22.8 %
MAN DIFF?: NORMAL
MCHC RBC-ENTMCNC: 30.6 PG
MCHC RBC-ENTMCNC: 32.1 GM/DL
MCV RBC AUTO: 95.5 FL
MONOCYTES # BLD AUTO: 0.71 K/UL
MONOCYTES NFR BLD AUTO: 9.1 %
NEUTROPHILS # BLD AUTO: 4.9 K/UL
NEUTROPHILS NFR BLD AUTO: 63.2 %
NONHDLC SERPL-MCNC: 96 MG/DL
PLATELET # BLD AUTO: 227 K/UL
POTASSIUM SERPL-SCNC: 4.1 MMOL/L
PROT SERPL-MCNC: 7.1 G/DL
RBC # BLD: 4.47 M/UL
RBC # FLD: 14.2 %
SODIUM SERPL-SCNC: 137 MMOL/L
TRIGL SERPL-MCNC: 93 MG/DL
TSH SERPL-ACNC: 2.71 UIU/ML
WBC # FLD AUTO: 7.76 K/UL

## 2021-08-02 ENCOUNTER — RX RENEWAL (OUTPATIENT)
Age: 76
End: 2021-08-02

## 2021-08-16 ENCOUNTER — NON-APPOINTMENT (OUTPATIENT)
Age: 76
End: 2021-08-16

## 2021-08-17 ENCOUNTER — NON-APPOINTMENT (OUTPATIENT)
Age: 76
End: 2021-08-17

## 2021-11-09 ENCOUNTER — RX RENEWAL (OUTPATIENT)
Age: 76
End: 2021-11-09

## 2021-12-01 ENCOUNTER — APPOINTMENT (OUTPATIENT)
Dept: INTERNAL MEDICINE | Facility: CLINIC | Age: 76
End: 2021-12-01
Payer: MEDICARE

## 2021-12-01 VITALS
HEIGHT: 70 IN | OXYGEN SATURATION: 96 % | HEART RATE: 71 BPM | SYSTOLIC BLOOD PRESSURE: 132 MMHG | TEMPERATURE: 97.7 F | BODY MASS INDEX: 28.35 KG/M2 | RESPIRATION RATE: 14 BRPM | DIASTOLIC BLOOD PRESSURE: 92 MMHG | WEIGHT: 198 LBS

## 2021-12-01 DIAGNOSIS — M54.42 LUMBAGO WITH SCIATICA, LEFT SIDE: ICD-10-CM

## 2021-12-01 DIAGNOSIS — I49.3 VENTRICULAR PREMATURE DEPOLARIZATION: ICD-10-CM

## 2021-12-01 DIAGNOSIS — Z13.39 ENCOUNTER FOR SCREENING EXAM FOR OTHER MENTAL HEALTH AND BEHAVIORAL DISORDERS: ICD-10-CM

## 2021-12-01 PROCEDURE — G0442 ANNUAL ALCOHOL SCREEN 15 MIN: CPT | Mod: 59

## 2021-12-01 PROCEDURE — G0439: CPT

## 2021-12-01 PROCEDURE — G0444 DEPRESSION SCREEN ANNUAL: CPT | Mod: 59

## 2021-12-01 PROCEDURE — 36415 COLL VENOUS BLD VENIPUNCTURE: CPT

## 2021-12-01 RX ORDER — VALSARTAN AND HYDROCHLOROTHIAZIDE 320; 12.5 MG/1; MG/1
320-12.5 TABLET, FILM COATED ORAL
Qty: 90 | Refills: 1 | Status: ACTIVE | COMMUNITY
Start: 2021-06-24

## 2021-12-01 NOTE — ASSESSMENT
[FreeTextEntry1] : 76-year-old male with known CAD via CTA remains asymptomatic with controlled hypertension and hyperlipidemia on present medications.Patient is clinically euthyroid on thyroid replacement therapy.\par \par The patient is status post DVT with PE June 2017 and on lower dose Eliquis 2.5 mg BID since on recommendation of cardiology despite negative hypercoagulable workup\par \par Coronary plaques seen on coronary calcium CAT scan therefore patient on aspirin and continued statin therapy\par \par To continue present medications\par Continue to diet and exercise encouraged\par \par Followup with specialists as scheduled\par Colonoscopy October 2018 with followup in 5 years\par \par Influenza and COVID  vaccines already received\par Up to date with all of the vaccines\par \par venipuncture done at today's office visit\par \par Followup in 3-4 months

## 2021-12-01 NOTE — HEALTH RISK ASSESSMENT
[Very Good] : ~his/her~  mood as very good [Yes] : Yes [2 - 4 times a month (2 pts)] : 2-4 times a month (2 points) [1 or 2 (0 pts)] : 1 or 2 (0 points) [Never (0 pts)] : Never (0 points) [No] : In the past 12 months have you used drugs other than those required for medical reasons? No [No falls in past year] : Patient reported no falls in the past year [0] : 2) Feeling down, depressed, or hopeless: Not at all (0) [None] : None [With Significant Other] : lives with significant other [# of Members in Household ___] :  household currently consist of [unfilled] member(s) [Retired] : retired [College] : College [] :  [# Of Children ___] : has [unfilled] children [Sexually Active] : sexually active [Feels Safe at Home] : Feels safe at home [Fully functional (bathing, dressing, toileting, transferring, walking, feeding)] : Fully functional (bathing, dressing, toileting, transferring, walking, feeding) [Fully functional (using the telephone, shopping, preparing meals, housekeeping, doing laundry, using] : Fully functional and needs no help or supervision to perform IADLs (using the telephone, shopping, preparing meals, housekeeping, doing laundry, using transportation, managing medications and managing finances) [Smoke Detector] : smoke detector [Carbon Monoxide Detector] : carbon monoxide detector [Guns at Home] : guns at home [Seat Belt] :  uses seat belt [Sunscreen] : uses sunscreen [Discussed at today's visit] : Advance Directives Discussed at today's visit [Designated Healthcare Proxy] : Designated healthcare proxy [Name: ___] : Health Care Proxy's Name: [unfilled]  [] : No [Audit-CScore] : 2 [de-identified] : exercises [de-identified] : good [[id number]] : 0 [Change in mental status noted] : No change in mental status noted [Reports changes in hearing] : Reports no changes in hearing [Reports changes in vision] : Reports no changes in vision [Reports changes in dental health] : Reports no changes in dental health [FreeTextEntry2] :  [de-identified] : locked and safe [AdvancecareDate] : 12/21

## 2021-12-01 NOTE — HISTORY OF PRESENT ILLNESS
[FreeTextEntry1] : 76-year-old male with history of CAD seen on CTA as well as hypertension on valsartan and metoprolol and hyperlipidemia on Livalo presents for his yearly physical.\par \par Patient also with history of hypothyroidism on thyroid replacement therapy.\par \par  June 2017 he had an acute leg DVT with associated pulmonary emboli. This occurred after the patient had an ankle injury. Otherwise there was no preceding incident to cause his DVT.\par The patient has been on Eliquis since.\par He did see hematology with a negative hypercoagulable workup. Hematology recommended 3 months of anticoagulation.\par Also being followed by vascular who felt the patient should be on anticoagulation for 6 months.\par .The patient continues on Eliquis on the recommendation of cardiology secondary to his DVT being essentially unprovoked and the patient's brother having had a history of multiple clots. Therefore, concern for some familial hypercoagulability, despite negative hypercoagulable workup.\par Patient continues on Eliquis 2.5 mg b.i.d.\par \par Patient generally feeling well without chest pain, palpitations or shortness of breath.\par He follows with cardiology every 6 months.\par Stress echocardiogram December 2018 = normal\par Coronary calcium score of 2020 = 331 therefore patient on aspirin daily.\par \par He sees urology once a year.\par \par On direct questioning patient without any complaints.\par The patient has made some good lifestyle changes mainly with dietary changes.

## 2021-12-01 NOTE — COUNSELING
[None] : None [Good understanding] : Patient has a good understanding of lifestyle changes and the steps needed to achieve self management goals [de-identified] : Continue diet and exercise

## 2021-12-02 LAB
ALBUMIN SERPL ELPH-MCNC: 4.7 G/DL
ALP BLD-CCNC: 63 U/L
ALT SERPL-CCNC: 35 U/L
ANION GAP SERPL CALC-SCNC: 16 MMOL/L
APPEARANCE: CLEAR
AST SERPL-CCNC: 26 U/L
BACTERIA: NEGATIVE
BASOPHILS # BLD AUTO: 0.05 K/UL
BASOPHILS NFR BLD AUTO: 0.7 %
BILIRUB SERPL-MCNC: 0.6 MG/DL
BILIRUBIN URINE: NEGATIVE
BLOOD URINE: NEGATIVE
BUN SERPL-MCNC: 21 MG/DL
CALCIUM SERPL-MCNC: 9.8 MG/DL
CHLORIDE SERPL-SCNC: 101 MMOL/L
CHOLEST SERPL-MCNC: 163 MG/DL
CO2 SERPL-SCNC: 22 MMOL/L
COLOR: YELLOW
CREAT SERPL-MCNC: 1.27 MG/DL
EOSINOPHIL # BLD AUTO: 0.22 K/UL
EOSINOPHIL NFR BLD AUTO: 3.3 %
ESTIMATED AVERAGE GLUCOSE: 131 MG/DL
GLUCOSE QUALITATIVE U: NEGATIVE
GLUCOSE SERPL-MCNC: 103 MG/DL
HBA1C MFR BLD HPLC: 6.2 %
HCT VFR BLD CALC: 44.1 %
HDLC SERPL-MCNC: 45 MG/DL
HGB BLD-MCNC: 14.7 G/DL
HYALINE CASTS: 0 /LPF
IMM GRANULOCYTES NFR BLD AUTO: 0.4 %
KETONES URINE: NEGATIVE
LDLC SERPL CALC-MCNC: 93 MG/DL
LEUKOCYTE ESTERASE URINE: NEGATIVE
LYMPHOCYTES # BLD AUTO: 1.58 K/UL
LYMPHOCYTES NFR BLD AUTO: 23.5 %
MAGNESIUM SERPL-MCNC: 2.3 MG/DL
MAN DIFF?: NORMAL
MCHC RBC-ENTMCNC: 31.1 PG
MCHC RBC-ENTMCNC: 33.3 GM/DL
MCV RBC AUTO: 93.4 FL
MICROSCOPIC-UA: NORMAL
MONOCYTES # BLD AUTO: 0.81 K/UL
MONOCYTES NFR BLD AUTO: 12.1 %
NEUTROPHILS # BLD AUTO: 4.02 K/UL
NEUTROPHILS NFR BLD AUTO: 60 %
NITRITE URINE: NEGATIVE
NONHDLC SERPL-MCNC: 118 MG/DL
PH URINE: 5.5
PLATELET # BLD AUTO: 207 K/UL
POTASSIUM SERPL-SCNC: 4.3 MMOL/L
PROT SERPL-MCNC: 7.7 G/DL
PROTEIN URINE: NORMAL
RBC # BLD: 4.72 M/UL
RBC # FLD: 14 %
RED BLOOD CELLS URINE: 3 /HPF
SODIUM SERPL-SCNC: 140 MMOL/L
SPECIFIC GRAVITY URINE: 1.01
SQUAMOUS EPITHELIAL CELLS: 0 /HPF
TRIGL SERPL-MCNC: 125 MG/DL
TSH SERPL-ACNC: 2.37 UIU/ML
UROBILINOGEN URINE: NORMAL
WBC # FLD AUTO: 6.71 K/UL
WHITE BLOOD CELLS URINE: 2 /HPF

## 2021-12-06 ENCOUNTER — NON-APPOINTMENT (OUTPATIENT)
Age: 76
End: 2021-12-06

## 2021-12-07 DIAGNOSIS — Z83.2 FAMILY HISTORY OF DISEASES OF THE BLOOD AND BLOOD-FORMING ORGANS AND CERTAIN DISORDERS INVOLVING THE IMMUNE MECHANISM: ICD-10-CM

## 2021-12-20 ENCOUNTER — RESULT REVIEW (OUTPATIENT)
Age: 76
End: 2021-12-20

## 2021-12-20 ENCOUNTER — OUTPATIENT (OUTPATIENT)
Dept: OUTPATIENT SERVICES | Facility: HOSPITAL | Age: 76
LOS: 1 days | Discharge: HOME | End: 2021-12-20

## 2021-12-20 ENCOUNTER — APPOINTMENT (OUTPATIENT)
Dept: HEMATOLOGY ONCOLOGY | Facility: CLINIC | Age: 76
End: 2021-12-20

## 2021-12-20 ENCOUNTER — APPOINTMENT (OUTPATIENT)
Dept: HEMATOLOGY ONCOLOGY | Facility: CLINIC | Age: 76
End: 2021-12-20
Payer: MEDICARE

## 2021-12-20 DIAGNOSIS — Z83.2 FAMILY HISTORY OF DISEASES OF THE BLOOD AND BLOOD-FORMING ORGANS AND CERTAIN DISORDERS INVOLVING THE IMMUNE MECHANISM: ICD-10-CM

## 2021-12-20 DIAGNOSIS — I82.412 ACUTE EMBOLISM AND THROMBOSIS OF LEFT FEMORAL VEIN: ICD-10-CM

## 2021-12-20 LAB
BASOPHILS # BLD AUTO: 0.1 K/UL — SIGNIFICANT CHANGE UP (ref 0–0.2)
BASOPHILS NFR BLD AUTO: 1.1 % — SIGNIFICANT CHANGE UP (ref 0–2)
EOSINOPHIL # BLD AUTO: 0.4 K/UL — SIGNIFICANT CHANGE UP (ref 0–0.5)
EOSINOPHIL NFR BLD AUTO: 5.1 % — SIGNIFICANT CHANGE UP (ref 0–6)
HCT VFR BLD CALC: 45.6 % — SIGNIFICANT CHANGE UP (ref 39–50)
HGB BLD-MCNC: 14.7 G/DL — SIGNIFICANT CHANGE UP (ref 13–17)
LYMPHOCYTES # BLD AUTO: 1.5 K/UL — SIGNIFICANT CHANGE UP (ref 1–3.3)
LYMPHOCYTES # BLD AUTO: 20.5 % — SIGNIFICANT CHANGE UP (ref 13–44)
MCHC RBC-ENTMCNC: 30.9 PG — SIGNIFICANT CHANGE UP (ref 27–34)
MCHC RBC-ENTMCNC: 32.1 G/DL — SIGNIFICANT CHANGE UP (ref 32–36)
MCV RBC AUTO: 96 FL — SIGNIFICANT CHANGE UP (ref 80–100)
MONOCYTES # BLD AUTO: 0.7 K/UL — SIGNIFICANT CHANGE UP (ref 0–0.9)
MONOCYTES NFR BLD AUTO: 9.6 % — SIGNIFICANT CHANGE UP (ref 2–14)
NEUTROPHILS # BLD AUTO: 4.6 K/UL — SIGNIFICANT CHANGE UP (ref 1.8–7.4)
NEUTROPHILS NFR BLD AUTO: 63.6 % — SIGNIFICANT CHANGE UP (ref 43–77)
PLATELET # BLD AUTO: 197 K/UL — SIGNIFICANT CHANGE UP (ref 150–400)
RBC # BLD: 4.75 M/UL — SIGNIFICANT CHANGE UP (ref 4.2–5.8)
RBC # FLD: 12.9 % — SIGNIFICANT CHANGE UP (ref 10.3–14.5)
WBC # BLD: 7.2 K/UL — SIGNIFICANT CHANGE UP (ref 3.8–10.5)
WBC # FLD AUTO: 7.2 K/UL — SIGNIFICANT CHANGE UP (ref 3.8–10.5)

## 2021-12-20 PROCEDURE — 99214 OFFICE O/P EST MOD 30 MIN: CPT

## 2021-12-20 NOTE — HISTORY OF PRESENT ILLNESS
[de-identified] : Mr. Henson is a 72 year old gentleman with a new onset DVT/PE.  He recalls twisting his ankle on 6/15/17 but denies any prolong immobility after the incident. He began to noticed left ankle/left leg swelling a few days after.  He denies any prolonged travel.  Denies any recent surgery.  He sought medical attention as the swelling and pain in left leg got worse. US doppler on 6/22/17 confirmed DVT of left common femoral to proximal calf veins.  He was started on Eliquis.  He followed up with Dr. Fairbanks and eventually had a CT chest/abd/pelvis on 6/26/17 to rule out malignancy.  Scans showed pulmonary emboli in right upper and lower lobar segmental branches, and no evidence of malignancy.\par His brother had blood clot in his 60s reportedly after aneurysm repair. No history of strokes in the family.\par \par \par  [de-identified] : Returns for follow up visit. \par Currently on Eliquis 2.5mg BID \par Requesting testing for prothrombin gene mutation testing due to son was diagnosed massive PE requiring surgical intervention in 11/2021, notes one week prior to event son had plane travel from [place] experienced calf pain, upon work up was found to be heterozygous for prothrombin gene mutation,  son following with Dr. Glenny Simon ([place]) \par \par

## 2021-12-20 NOTE — ASSESSMENT
[FreeTextEntry1] : 76 year old gentleman with h/o DVT and bilateral pulmonary emboli.  A careful review of his history indicates that this was likely an unprovoked first event. We had previously reviewed data for thrombophilia testing which is indicated in patients <45 years old, recurrent thrombosis, and those with thrombosis in unusual vascular beds, such as hepatic/portal veins.  It's also indicated for patients with a family history of VTE with first degree relative, in age <45.  His brother had a blood clot in his 60s after a surgery.\par \par His son was recently diagnosed with DVT/PE s/p plane travel and was found to be heterozygous for PT gene mutation.  \par \par Plan:\par Prothrombin gene mutation ordered, will call with results \par Advised immediate that affected individual's (son) family members should be tested for gene mutation

## 2021-12-20 NOTE — CONSULT LETTER
[Dear  ___] : Dear  [unfilled], [Consult Letter:] : I had the pleasure of evaluating your patient, [unfilled]. [Please see my note below.] : Please see my note below. [Consult Closing:] : Thank you very much for allowing me to participate in the care of this patient.  If you have any questions, please do not hesitate to contact me. [Sincerely,] : Sincerely, [DrNany  ___] : Dr. BARRY [FreeTextEntry3] : Silke Liu MD\par Medical Oncology/Hematology\par [facility] [place]\par [facility] [place]

## 2021-12-28 ENCOUNTER — NON-APPOINTMENT (OUTPATIENT)
Age: 76
End: 2021-12-28

## 2021-12-28 LAB — PTR INTERP: NORMAL

## 2022-01-31 ENCOUNTER — RX RENEWAL (OUTPATIENT)
Age: 77
End: 2022-01-31

## 2022-03-01 ENCOUNTER — APPOINTMENT (OUTPATIENT)
Dept: INTERNAL MEDICINE | Facility: CLINIC | Age: 77
End: 2022-03-01
Payer: MEDICARE

## 2022-03-01 VITALS
HEIGHT: 70 IN | RESPIRATION RATE: 13 BRPM | WEIGHT: 198 LBS | HEART RATE: 64 BPM | TEMPERATURE: 97.3 F | BODY MASS INDEX: 28.35 KG/M2 | DIASTOLIC BLOOD PRESSURE: 82 MMHG | SYSTOLIC BLOOD PRESSURE: 125 MMHG | OXYGEN SATURATION: 98 %

## 2022-03-01 PROCEDURE — 99214 OFFICE O/P EST MOD 30 MIN: CPT | Mod: 25

## 2022-03-01 PROCEDURE — 36415 COLL VENOUS BLD VENIPUNCTURE: CPT

## 2022-03-02 ENCOUNTER — NON-APPOINTMENT (OUTPATIENT)
Age: 77
End: 2022-03-02

## 2022-03-02 LAB
ALBUMIN SERPL ELPH-MCNC: 4.8 G/DL
ALP BLD-CCNC: 56 U/L
ALT SERPL-CCNC: 36 U/L
ANION GAP SERPL CALC-SCNC: 16 MMOL/L
AST SERPL-CCNC: 27 U/L
BASOPHILS # BLD AUTO: 0.05 K/UL
BASOPHILS NFR BLD AUTO: 0.8 %
BILIRUB SERPL-MCNC: 0.5 MG/DL
BUN SERPL-MCNC: 28 MG/DL
CALCIUM SERPL-MCNC: 9.8 MG/DL
CHLORIDE SERPL-SCNC: 100 MMOL/L
CHOLEST SERPL-MCNC: 155 MG/DL
CO2 SERPL-SCNC: 23 MMOL/L
CREAT SERPL-MCNC: 1.32 MG/DL
EGFR: 56 ML/MIN/1.73M2
EOSINOPHIL # BLD AUTO: 0.25 K/UL
EOSINOPHIL NFR BLD AUTO: 3.8 %
ESTIMATED AVERAGE GLUCOSE: 128 MG/DL
GLUCOSE SERPL-MCNC: 89 MG/DL
HBA1C MFR BLD HPLC: 6.1 %
HCT VFR BLD CALC: 42.8 %
HDLC SERPL-MCNC: 41 MG/DL
HGB BLD-MCNC: 13.8 G/DL
IMM GRANULOCYTES NFR BLD AUTO: 0.5 %
LDLC SERPL CALC-MCNC: 91 MG/DL
LYMPHOCYTES # BLD AUTO: 1.81 K/UL
LYMPHOCYTES NFR BLD AUTO: 27.2 %
MAN DIFF?: NORMAL
MCHC RBC-ENTMCNC: 31.3 PG
MCHC RBC-ENTMCNC: 32.2 GM/DL
MCV RBC AUTO: 97.1 FL
MONOCYTES # BLD AUTO: 0.69 K/UL
MONOCYTES NFR BLD AUTO: 10.4 %
NEUTROPHILS # BLD AUTO: 3.82 K/UL
NEUTROPHILS NFR BLD AUTO: 57.3 %
NONHDLC SERPL-MCNC: 114 MG/DL
PLATELET # BLD AUTO: 215 K/UL
POTASSIUM SERPL-SCNC: 4.4 MMOL/L
PROT SERPL-MCNC: 7.4 G/DL
RBC # BLD: 4.41 M/UL
RBC # FLD: 13.9 %
SODIUM SERPL-SCNC: 139 MMOL/L
TRIGL SERPL-MCNC: 116 MG/DL
TSH SERPL-ACNC: 1.81 UIU/ML
WBC # FLD AUTO: 6.65 K/UL

## 2022-03-02 NOTE — ASSESSMENT
[FreeTextEntry1] : Venipuncture done in our office, Labs sent out/ further recommendations will be made based on lab results. Patient advised to continue present medications with diet/exercise and specialist followup. Patient will return to the office in 3-4months\par \par colonoscopy was 10/2018-next in 5 years\par vaccines are UTD, +got covid vaccines X3\par specialists include\par 1. cardiology-Dr. Matias\par 2. ophthalmology-Site MD\par 3. urology yearly-Dr. Lowe-may stop going and just have DALJIT and PSA here Qyear\par 4. podiatry on  a p.r.n. basis-Dr. Mercado/Dr. Kim\par 5.hematology-Dr. Liu\par 6.  Vascular-Dr. Lennon-no need for follow up\par HIV screen=4/2018\par Hepatitis C screening=4/2018\par Echo stress test 12/2018

## 2022-03-02 NOTE — HISTORY OF PRESENT ILLNESS
[FreeTextEntry1] : Patient presents for followup on hypertension/hyperlipidemia/hypothyroid. Patient is currently fasting for today's labs and offers no acute complaints. Patient is currently on Diovan HCT/labetalol for hypertension, on Livalo for hyperlipidemia and is on Synthroid for his hypothyroidism.\par -got covid vaccines X3\par -cardio changed toprol to labetalol\par \par

## 2022-03-21 NOTE — ED ADULT NURSE NOTE - NS ED PATIENT SAFETY CONCERN
Pt called, she did p/u the provera but did not use as recommended.  Her mother has a h/o PE's and blood clotting issues.  Pt also went on a long roadtrip which was not recommended with using this medication.  Will discuss with Dr. Lane.      No

## 2022-04-11 PROBLEM — Z11.59 SCREENING FOR VIRAL DISEASE: Status: RESOLVED | Noted: 2020-06-26 | Resolved: 2020-12-08

## 2022-04-25 ENCOUNTER — APPOINTMENT (OUTPATIENT)
Dept: INTERNAL MEDICINE | Facility: CLINIC | Age: 77
End: 2022-04-25
Payer: MEDICARE

## 2022-04-25 VITALS
BODY MASS INDEX: 28.06 KG/M2 | HEART RATE: 75 BPM | SYSTOLIC BLOOD PRESSURE: 124 MMHG | OXYGEN SATURATION: 98 % | TEMPERATURE: 97 F | RESPIRATION RATE: 14 BRPM | WEIGHT: 196 LBS | HEIGHT: 70 IN | DIASTOLIC BLOOD PRESSURE: 80 MMHG

## 2022-04-25 PROCEDURE — 36415 COLL VENOUS BLD VENIPUNCTURE: CPT

## 2022-04-25 PROCEDURE — 99213 OFFICE O/P EST LOW 20 MIN: CPT | Mod: 25

## 2022-04-25 NOTE — HISTORY OF PRESENT ILLNESS
[FreeTextEntry1] : Patient presents for followup on hypertension/repeat labs. Patient is currently on Diovan HCT/labetalol for hypertension and was found to have BUN/creatinine elevated at 28/1.3 with previous blood work, abnormal results discussed with patient and questions answered\par -got covid vaccines X3\par \par \par

## 2022-04-25 NOTE — ASSESSMENT
[FreeTextEntry1] : Venipuncture done in our office, Labs sent out/ further recommendations will be made based on lab results. Patient advised to continue present medications with diet/exercise and specialist followup. Patient will return to the office as sched for reg check\par \par colonoscopy was 10/2018-next in 5 years\par vaccines are UTD, +got covid vaccines X3\par specialists include\par 1. cardiology-Dr. Matias\par 2. ophthalmology-Site MD\par 3. urology yearly-Dr. Lowe-may stop going and just have DALJIT and PSA here Qyear\par 4. podiatry on  a p.r.n. basis-Dr. Mercado/Dr. Kim\par 5.hematology-Dr. Liu\par 6.  Vascular-Dr. Lennon-no need for follow up\par HIV screen=4/2018\par Hepatitis C screening=4/2018\par Echo stress test 12/2018

## 2022-04-26 ENCOUNTER — NON-APPOINTMENT (OUTPATIENT)
Age: 77
End: 2022-04-26

## 2022-04-26 LAB
ANION GAP SERPL CALC-SCNC: 18 MMOL/L
BUN SERPL-MCNC: 23 MG/DL
CALCIUM SERPL-MCNC: 9.7 MG/DL
CHLORIDE SERPL-SCNC: 100 MMOL/L
CO2 SERPL-SCNC: 22 MMOL/L
CREAT SERPL-MCNC: 1.22 MG/DL
EGFR: 61 ML/MIN/1.73M2
GLUCOSE SERPL-MCNC: 117 MG/DL
POTASSIUM SERPL-SCNC: 4.1 MMOL/L
SODIUM SERPL-SCNC: 140 MMOL/L

## 2022-05-09 ENCOUNTER — NON-APPOINTMENT (OUTPATIENT)
Age: 77
End: 2022-05-09

## 2022-05-09 ENCOUNTER — RX RENEWAL (OUTPATIENT)
Age: 77
End: 2022-05-09

## 2022-05-10 ENCOUNTER — NON-APPOINTMENT (OUTPATIENT)
Age: 77
End: 2022-05-10

## 2022-07-06 ENCOUNTER — APPOINTMENT (OUTPATIENT)
Dept: INTERNAL MEDICINE | Facility: CLINIC | Age: 77
End: 2022-07-06

## 2022-07-06 DIAGNOSIS — U07.1 COVID-19: ICD-10-CM

## 2022-07-06 DIAGNOSIS — R05.9 COUGH, UNSPECIFIED: ICD-10-CM

## 2022-07-06 DIAGNOSIS — R52 PAIN, UNSPECIFIED: ICD-10-CM

## 2022-07-06 DIAGNOSIS — Z71.89 OTHER SPECIFIED COUNSELING: ICD-10-CM

## 2022-07-06 DIAGNOSIS — R53.83 OTHER FATIGUE: ICD-10-CM

## 2022-07-06 PROCEDURE — 99441: CPT | Mod: 95,CS

## 2022-07-07 NOTE — ASSESSMENT
[FreeTextEntry1] : Patient with COVID infection with relatively mild symptoms therefore feel no current indication to treat with MABI.\par Paxlovid  Contraindicated with patient being on Eliquis.\par Also he is without any significant major risks for bad disease other than age.\par Therefore recommend plenty of rest and fluids and symptomatic treatment.\par Patient told to call with any persistent or worsening symptoms.

## 2022-07-07 NOTE — HISTORY OF PRESENT ILLNESS
[Home] : at home, [unfilled] , at the time of the visit. [Medical Office: ([facility])___] : at the medical office located in  [Verbal consent obtained from patient] : the patient, [unfilled] [FreeTextEntry8] : The patient presents for an acute telephone if this is secondary to testing positive for COVID today.\par The patient's wife tested positive on 7/4.\par His symptoms are relatively mild and started yesterday and include slight cough, mild congestion, body aches and fatigue.\par No fever, chills, shortness of breath, chest pain, GI symptoms, headache or loss of taste or smell.\par He is resting and drinking fluids.\par No worse today than yesterday.\par He is vaccinated.

## 2022-07-28 ENCOUNTER — RX RENEWAL (OUTPATIENT)
Age: 77
End: 2022-07-28

## 2022-10-04 ENCOUNTER — APPOINTMENT (OUTPATIENT)
Dept: INTERNAL MEDICINE | Facility: CLINIC | Age: 77
End: 2022-10-04

## 2022-10-04 VITALS
SYSTOLIC BLOOD PRESSURE: 130 MMHG | OXYGEN SATURATION: 98 % | HEIGHT: 70 IN | WEIGHT: 198 LBS | HEART RATE: 65 BPM | BODY MASS INDEX: 28.35 KG/M2 | RESPIRATION RATE: 13 BRPM | TEMPERATURE: 97 F | DIASTOLIC BLOOD PRESSURE: 78 MMHG

## 2022-10-04 DIAGNOSIS — J06.9 ACUTE UPPER RESPIRATORY INFECTION, UNSPECIFIED: ICD-10-CM

## 2022-10-04 PROCEDURE — 99213 OFFICE O/P EST LOW 20 MIN: CPT

## 2022-10-04 RX ORDER — ASPIRIN ENTERIC COATED TABLETS 81 MG 81 MG/1
81 TABLET, DELAYED RELEASE ORAL
Refills: 0 | Status: ACTIVE | COMMUNITY
Start: 2022-10-04

## 2022-10-04 NOTE — PHYSICAL EXAM
[No Acute Distress] : no acute distress [No Respiratory Distress] : no respiratory distress  [Clear to Auscultation] : lungs were clear to auscultation bilaterally [Normal Rate] : normal rate  [Regular Rhythm] : with a regular rhythm [Normal Affect] : the affect was normal [Normal Mood] : the mood was normal [Normal Outer Ear/Nose] : the outer ears and nose were normal in appearance [Normal Oropharynx] : the oropharynx was normal [Normal TMs] : both tympanic membranes were normal [Normal Nasal Mucosa] : the nasal mucosa was normal [Supple] : supple [de-identified] : voice = horse [de-identified] : +cough noted

## 2022-10-04 NOTE — HISTORY OF PRESENT ILLNESS
[FreeTextEntry8] : Patient presents complaining of not feeling well for 6 days. Patient is complaining of hoarse voice/cough and fatigue. Patient denies sore throat/fever. Patient states his taste and smell are decreased, home covid test was negative and he has had no known exposure. Patient tried OTC medication without benefit

## 2022-10-04 NOTE — ASSESSMENT
[FreeTextEntry1] : Patient prescribed Z-Fransisco #1 as directed/Tessalon, viral swab sent.  .Patient advised to rest/increase fluids and use supportive therapy. Patient will call if symptoms persist or worsen and return to the office as scheduled for regular followup.\par \par \par Dr. Fairbanks was present in office building while I examined patient\par

## 2022-10-05 ENCOUNTER — NON-APPOINTMENT (OUTPATIENT)
Age: 77
End: 2022-10-05

## 2022-10-05 LAB
INFLUENZA A RESULT: NOT DETECTED
INFLUENZA B RESULT: NOT DETECTED
RESP SYN VIRUS RESULT: NOT DETECTED
SARS-COV-2 RESULT: NOT DETECTED

## 2022-10-11 ENCOUNTER — APPOINTMENT (OUTPATIENT)
Dept: INTERNAL MEDICINE | Facility: CLINIC | Age: 77
End: 2022-10-11

## 2022-11-04 ENCOUNTER — RX RENEWAL (OUTPATIENT)
Age: 77
End: 2022-11-04

## 2022-11-07 ENCOUNTER — APPOINTMENT (OUTPATIENT)
Dept: INTERNAL MEDICINE | Facility: CLINIC | Age: 77
End: 2022-11-07

## 2022-12-13 ENCOUNTER — APPOINTMENT (OUTPATIENT)
Dept: INTERNAL MEDICINE | Facility: CLINIC | Age: 77
End: 2022-12-13

## 2022-12-13 VITALS
RESPIRATION RATE: 12 BRPM | SYSTOLIC BLOOD PRESSURE: 115 MMHG | WEIGHT: 198 LBS | OXYGEN SATURATION: 97 % | HEART RATE: 65 BPM | HEIGHT: 70 IN | BODY MASS INDEX: 28.35 KG/M2 | DIASTOLIC BLOOD PRESSURE: 70 MMHG

## 2022-12-13 DIAGNOSIS — Z13.31 ENCOUNTER FOR SCREENING FOR DEPRESSION: ICD-10-CM

## 2022-12-13 DIAGNOSIS — K76.89 OTHER SPECIFIED DISEASES OF LIVER: ICD-10-CM

## 2022-12-13 PROCEDURE — 36415 COLL VENOUS BLD VENIPUNCTURE: CPT

## 2022-12-13 PROCEDURE — G0439: CPT

## 2022-12-13 PROCEDURE — G0444 DEPRESSION SCREEN ANNUAL: CPT

## 2022-12-13 NOTE — HISTORY OF PRESENT ILLNESS
[FreeTextEntry1] : 77-year-old male with history of CAD seen on CTA as well as hypertension on valsartan and metoprolol and hyperlipidemia on Livalo presents for his yearly physical.\par \par Patient also with history of hypothyroidism on thyroid replacement therapy.\par \par  June 2017 he had an acute leg DVT with associated pulmonary emboli. This occurred after the patient had an ankle injury. Otherwise there was no preceding incident to cause his DVT.\par The patient has been on Eliquis since.\par He did see hematology with a negative hypercoagulable workup. Hematology recommended 3 months of anticoagulation.\par Also being followed by vascular who felt the patient should be on anticoagulation for 6 months.\par .The patient continues on Eliquis on the recommendation of cardiology secondary to his DVT being essentially unprovoked and the patient's brother having had a history of multiple clots. Therefore, concern for some familial hypercoagulability, despite negative hypercoagulable workup.\par Patient continues on Eliquis 2.5 mg b.i.d.\par He did follow-up with hematology secondary to his son diagnosed with a massive PE requiring surgical intervention with work-up finding and heterozygous for the prothrombin gene mutation.\par Therefore the patient had blood work done and he is also heterozygous for prothrombin gene mutation.  Therefore lifelong anticoagulation.\par \par Patient generally feeling well without chest pain, palpitations or shortness of breath.\par He follows with cardiology every 6 months.\par Stress echocardiogram April 2022 normal with mild MR/TR/WY\par Coronary calcium score of 2020 = 331 therefore patient on aspirin daily.\par \par He sees urology once a year.\par \par On direct questioning patient without any complaints.\par The patient has made some good lifestyle changes mainly with dietary changes.

## 2022-12-13 NOTE — PHYSICAL EXAM
[General Appearance - Alert] : alert [General Appearance - In No Acute Distress] : in no acute distress [Sclera] : the sclera and conjunctiva were normal [PERRL With Normal Accommodation] : pupils were equal in size, round, and reactive to light [Extraocular Movements] : extraocular movements were intact [Outer Ear] : the ears and nose were normal in appearance [Oropharynx] : the oropharynx was normal [Neck Appearance] : the appearance of the neck was normal [Jugular Venous Distention Increased] : there was no jugular-venous distention [Neck Cervical Mass (___cm)] : no neck mass was observed [Thyroid Diffuse Enlargement] : the thyroid was not enlarged [Thyroid Nodule] : there were no palpable thyroid nodules [Auscultation Breath Sounds / Voice Sounds] : lungs were clear to auscultation bilaterally [Heart Rate And Rhythm] : heart rate was normal and rhythm regular [Heart Sounds] : normal S1 and S2 [Heart Sounds Gallop] : no gallops [Murmurs] : no murmurs [Heart Sounds Pericardial Friction Rub] : no pericardial rub [Arterial Pulses Carotid] : carotid pulses were normal with no bruits [Abdominal Aorta] : the abdominal aorta was normal [Arterial Pulses Femoral] : femoral pulses were normal without bruits [Full Pulse] : the pedal pulses are present [Edema] : there was no peripheral edema [Veins - Varicosity Changes] : there were no varicosital changes [Bowel Sounds] : normal bowel sounds [Abdomen Soft] : soft [Abdomen Tenderness] : non-tender [Abdomen Mass (___ Cm)] : no abdominal mass palpated [Cervical Lymph Nodes Enlarged Posterior Bilaterally] : posterior cervical [Cervical Lymph Nodes Enlarged Anterior Bilaterally] : anterior cervical [Supraclavicular Lymph Nodes Enlarged Bilaterally] : supraclavicular [Axillary Lymph Nodes Enlarged Bilaterally] : axillary [Femoral Lymph Nodes Enlarged Bilaterally] : femoral [Inguinal Lymph Nodes Enlarged Bilaterally] : inguinal [No Spinal Tenderness] : no spinal tenderness [Abnormal Walk] : normal gait [Nail Clubbing] : no clubbing  or cyanosis of the fingernails [Musculoskeletal - Swelling] : no joint swelling seen [Motor Tone] : muscle strength and tone were normal [Skin Color & Pigmentation] : normal skin color and pigmentation [Skin Turgor] : normal skin turgor [] : no rash [Cranial Nerves] : cranial nerves 2-12 were intact [No Focal Deficits] : no focal deficits [Oriented To Time, Place, And Person] : oriented to person, place, and time [Impaired Insight] : insight and judgment were intact [Affect] : the affect was normal [FreeTextEntry1] : via urology

## 2022-12-13 NOTE — COUNSELING
[None] : None [Good understanding] : Patient has a good understanding of lifestyle changes and the steps needed to achieve self management goals [de-identified] : Continue diet and exercise

## 2022-12-13 NOTE — HEALTH RISK ASSESSMENT
[Very Good] : ~his/her~ current health as very good [Never] : Never [Yes] : Yes [2 - 4 times a month (2 pts)] : 2-4 times a month (2 points) [1 or 2 (0 pts)] : 1 or 2 (0 points) [Never (0 pts)] : Never (0 points) [No] : In the past 12 months have you used drugs other than those required for medical reasons? No [No falls in past year] : Patient reported no falls in the past year [0] : 2) Feeling down, depressed, or hopeless: Not at all (0) [None] : None [With Significant Other] : lives with significant other [# of Members in Household ___] :  household currently consist of [unfilled] member(s) [Retired] : retired [College] : College [] :  [# Of Children ___] : has [unfilled] children [Sexually Active] : sexually active [Feels Safe at Home] : Feels safe at home [Fully functional (bathing, dressing, toileting, transferring, walking, feeding)] : Fully functional (bathing, dressing, toileting, transferring, walking, feeding) [Fully functional (using the telephone, shopping, preparing meals, housekeeping, doing laundry, using] : Fully functional and needs no help or supervision to perform IADLs (using the telephone, shopping, preparing meals, housekeeping, doing laundry, using transportation, managing medications and managing finances) [Smoke Detector] : smoke detector [Carbon Monoxide Detector] : carbon monoxide detector [Guns at Home] : guns at home [Seat Belt] :  uses seat belt [Sunscreen] : uses sunscreen [Discussed at today's visit] : Advance Directives Discussed at today's visit [Designated Healthcare Proxy] : Designated healthcare proxy [Name: ___] : Health Care Proxy's Name: [unfilled]  [Excellent] : ~his/her~  mood as  excellent [PHQ-2 Negative - No further assessment needed] : PHQ-2 Negative - No further assessment needed [Reviewed no changes] : Reviewed, no changes [Audit-CScore] : 2 [de-identified] : exercises [de-identified] : good [HRP0Sjaim] : 0 [Change in mental status noted] : No change in mental status noted [Reports changes in hearing] : Reports no changes in hearing [Reports changes in vision] : Reports no changes in vision [Reports changes in dental health] : Reports no changes in dental health [FreeTextEntry2] :  [de-identified] : locked and safe [AdvancecareDate] : 12/22

## 2022-12-13 NOTE — ASSESSMENT
[FreeTextEntry1] : 77-year-old male with known CAD via CTA remains asymptomatic with controlled hypertension and hyperlipidemia on present medications.Patient is clinically euthyroid on thyroid replacement therapy.\par \par The patient is status post DVT with PE June 2017 and on lower dose Eliquis 2.5 mg BID since on recommendation of cardiology despite negative hypercoagulable workup.\par Recent hypercoagulable work-up this year shows patient to be heterozygous for the prothrombin gene mutation therefore lifelong anticoagulation.\par \par Coronary plaques seen on coronary calcium CAT scan therefore patient on aspirin and continued statin therapy\par \par To continue present medications\par Continue to diet and exercise encouraged\par \par Followup with specialists as scheduled\par Colonoscopy October 2018 with followup in 5 years\par \par Influenza and COVID  vaccines already received\par Up to date with all of the vaccines\par \par venipuncture done at today's office visit\par \par Followup in 3-4 months

## 2022-12-14 ENCOUNTER — NON-APPOINTMENT (OUTPATIENT)
Age: 77
End: 2022-12-14

## 2022-12-14 LAB
ALBUMIN SERPL ELPH-MCNC: 4.6 G/DL
ALP BLD-CCNC: 67 U/L
ALT SERPL-CCNC: 27 U/L
ANION GAP SERPL CALC-SCNC: 13 MMOL/L
APPEARANCE: CLEAR
AST SERPL-CCNC: 21 U/L
BACTERIA: NEGATIVE
BASOPHILS # BLD AUTO: 0.06 K/UL
BASOPHILS NFR BLD AUTO: 0.8 %
BILIRUB SERPL-MCNC: 0.6 MG/DL
BILIRUBIN URINE: NEGATIVE
BLOOD URINE: NEGATIVE
BUN SERPL-MCNC: 24 MG/DL
CALCIUM SERPL-MCNC: 9.8 MG/DL
CHLORIDE SERPL-SCNC: 102 MMOL/L
CHOLEST SERPL-MCNC: 159 MG/DL
CO2 SERPL-SCNC: 25 MMOL/L
COLOR: NORMAL
CREAT SERPL-MCNC: 1.41 MG/DL
EGFR: 51 ML/MIN/1.73M2
EOSINOPHIL # BLD AUTO: 0.38 K/UL
EOSINOPHIL NFR BLD AUTO: 5.4 %
ESTIMATED AVERAGE GLUCOSE: 126 MG/DL
GLUCOSE QUALITATIVE U: NEGATIVE
GLUCOSE SERPL-MCNC: 101 MG/DL
HBA1C MFR BLD HPLC: 6 %
HCT VFR BLD CALC: 43.9 %
HDLC SERPL-MCNC: 47 MG/DL
HGB BLD-MCNC: 14.2 G/DL
HYALINE CASTS: 0 /LPF
IMM GRANULOCYTES NFR BLD AUTO: 0.4 %
KETONES URINE: NEGATIVE
LDLC SERPL CALC-MCNC: 88 MG/DL
LEUKOCYTE ESTERASE URINE: NEGATIVE
LYMPHOCYTES # BLD AUTO: 1.86 K/UL
LYMPHOCYTES NFR BLD AUTO: 26.2 %
MAGNESIUM SERPL-MCNC: 2.2 MG/DL
MAN DIFF?: NORMAL
MCHC RBC-ENTMCNC: 30.1 PG
MCHC RBC-ENTMCNC: 32.3 GM/DL
MCV RBC AUTO: 93.2 FL
MICROSCOPIC-UA: NORMAL
MONOCYTES # BLD AUTO: 0.78 K/UL
MONOCYTES NFR BLD AUTO: 11 %
NEUTROPHILS # BLD AUTO: 3.98 K/UL
NEUTROPHILS NFR BLD AUTO: 56.2 %
NITRITE URINE: NEGATIVE
NONHDLC SERPL-MCNC: 112 MG/DL
PH URINE: 6.5
PLATELET # BLD AUTO: 218 K/UL
POTASSIUM SERPL-SCNC: 4.7 MMOL/L
PROT SERPL-MCNC: 7.3 G/DL
PROTEIN URINE: NEGATIVE
RBC # BLD: 4.71 M/UL
RBC # FLD: 13.7 %
RED BLOOD CELLS URINE: 0 /HPF
SODIUM SERPL-SCNC: 140 MMOL/L
SPECIFIC GRAVITY URINE: 1.01
SQUAMOUS EPITHELIAL CELLS: 0 /HPF
T4 FREE SERPL-MCNC: 1.3 NG/DL
TRIGL SERPL-MCNC: 117 MG/DL
TSH SERPL-ACNC: 1.19 UIU/ML
UROBILINOGEN URINE: NORMAL
WBC # FLD AUTO: 7.09 K/UL
WHITE BLOOD CELLS URINE: 0 /HPF

## 2023-01-15 LAB
ANION GAP SERPL CALC-SCNC: 13 MMOL/L
BUN SERPL-MCNC: 19 MG/DL
CALCIUM SERPL-MCNC: 9.6 MG/DL
CHLORIDE SERPL-SCNC: 103 MMOL/L
CO2 SERPL-SCNC: 25 MMOL/L
CREAT SERPL-MCNC: 1.29 MG/DL
EGFR: 57 ML/MIN/1.73M2
GLUCOSE SERPL-MCNC: 155 MG/DL
POTASSIUM SERPL-SCNC: 4.3 MMOL/L
SODIUM SERPL-SCNC: 142 MMOL/L

## 2023-01-17 ENCOUNTER — NON-APPOINTMENT (OUTPATIENT)
Age: 78
End: 2023-01-17

## 2023-01-24 ENCOUNTER — RX RENEWAL (OUTPATIENT)
Age: 78
End: 2023-01-24

## 2023-01-27 ENCOUNTER — NON-APPOINTMENT (OUTPATIENT)
Age: 78
End: 2023-01-27

## 2023-04-24 ENCOUNTER — LABORATORY RESULT (OUTPATIENT)
Age: 78
End: 2023-04-24

## 2023-04-24 ENCOUNTER — APPOINTMENT (OUTPATIENT)
Dept: INTERNAL MEDICINE | Facility: CLINIC | Age: 78
End: 2023-04-24
Payer: MEDICARE

## 2023-04-24 VITALS
HEIGHT: 70 IN | OXYGEN SATURATION: 95 % | RESPIRATION RATE: 13 BRPM | HEART RATE: 65 BPM | WEIGHT: 198 LBS | SYSTOLIC BLOOD PRESSURE: 120 MMHG | BODY MASS INDEX: 28.35 KG/M2 | DIASTOLIC BLOOD PRESSURE: 80 MMHG

## 2023-04-24 DIAGNOSIS — Z11.59 ENCOUNTER FOR SCREENING FOR OTHER VIRAL DISEASES: ICD-10-CM

## 2023-04-24 DIAGNOSIS — J06.9 ACUTE UPPER RESPIRATORY INFECTION, UNSPECIFIED: ICD-10-CM

## 2023-04-24 PROCEDURE — 36415 COLL VENOUS BLD VENIPUNCTURE: CPT

## 2023-04-24 PROCEDURE — 99214 OFFICE O/P EST MOD 30 MIN: CPT | Mod: 25

## 2023-04-24 RX ORDER — AZITHROMYCIN 250 MG/1
250 TABLET, FILM COATED ORAL
Qty: 1 | Refills: 0 | Status: DISCONTINUED | COMMUNITY
Start: 2022-10-04 | End: 2023-04-24

## 2023-04-24 RX ORDER — BENZONATATE 200 MG/1
200 CAPSULE ORAL 3 TIMES DAILY
Qty: 30 | Refills: 0 | Status: DISCONTINUED | COMMUNITY
Start: 2022-10-04 | End: 2023-04-24

## 2023-04-25 ENCOUNTER — APPOINTMENT (OUTPATIENT)
Dept: INTERNAL MEDICINE | Facility: CLINIC | Age: 78
End: 2023-04-25

## 2023-04-25 LAB
ALBUMIN SERPL ELPH-MCNC: 4.5 G/DL
ALP BLD-CCNC: 77 U/L
ALT SERPL-CCNC: 28 U/L
ANION GAP SERPL CALC-SCNC: 14 MMOL/L
AST SERPL-CCNC: 20 U/L
BASOPHILS # BLD AUTO: 0.03 K/UL
BASOPHILS NFR BLD AUTO: 0.7 %
BILIRUB SERPL-MCNC: 0.4 MG/DL
BUN SERPL-MCNC: 24 MG/DL
CALCIUM SERPL-MCNC: 9.3 MG/DL
CHLORIDE SERPL-SCNC: 105 MMOL/L
CHOLEST SERPL-MCNC: 142 MG/DL
CO2 SERPL-SCNC: 24 MMOL/L
CREAT SERPL-MCNC: 1.29 MG/DL
EGFR: 57 ML/MIN/1.73M2
EOSINOPHIL # BLD AUTO: 0.37 K/UL
EOSINOPHIL NFR BLD AUTO: 8.6 %
ESTIMATED AVERAGE GLUCOSE: 126 MG/DL
GLUCOSE SERPL-MCNC: 122 MG/DL
HBA1C MFR BLD HPLC: 6 %
HCT VFR BLD CALC: 41.5 %
HDLC SERPL-MCNC: 38 MG/DL
HGB BLD-MCNC: 13.4 G/DL
IMM GRANULOCYTES NFR BLD AUTO: 0.2 %
INFLUENZA A RESULT: NOT DETECTED
INFLUENZA B RESULT: NOT DETECTED
LDLC SERPL CALC-MCNC: 78 MG/DL
LYMPHOCYTES # BLD AUTO: 0.96 K/UL
LYMPHOCYTES NFR BLD AUTO: 22.2 %
MAN DIFF?: NORMAL
MCHC RBC-ENTMCNC: 30.5 PG
MCHC RBC-ENTMCNC: 32.3 GM/DL
MCV RBC AUTO: 94.3 FL
MONOCYTES # BLD AUTO: 0.65 K/UL
MONOCYTES NFR BLD AUTO: 15 %
NEUTROPHILS # BLD AUTO: 2.3 K/UL
NEUTROPHILS NFR BLD AUTO: 53.3 %
NONHDLC SERPL-MCNC: 103 MG/DL
PLATELET # BLD AUTO: 172 K/UL
POTASSIUM SERPL-SCNC: 4.3 MMOL/L
PROT SERPL-MCNC: 7 G/DL
RBC # BLD: 4.4 M/UL
RBC # FLD: 14.5 %
RESP SYN VIRUS RESULT: NOT DETECTED
SARS-COV-2 RESULT: NOT DETECTED
SODIUM SERPL-SCNC: 142 MMOL/L
TRIGL SERPL-MCNC: 128 MG/DL
TSH SERPL-ACNC: 1.96 UIU/ML
WBC # FLD AUTO: 4.32 K/UL

## 2023-04-25 NOTE — HISTORY OF PRESENT ILLNESS
[FreeTextEntry8] : Patient presents complaining of not feeling well for about 5 days.  Patient is complaining of phlegm that he feels in his throat, chest with associated wheezing without overt dyspnea.  Patient denies fever/sick contacts/COVID exposure, using Robitussin.  Patient states he was sick in [place] about 1 month ago and is questioning if this recurrence is possibly allergy related, has never had allergies\par \par Patient is scheduled to come in tomorrow for blood work but would like to do today while here

## 2023-04-25 NOTE — ASSESSMENT
[FreeTextEntry1] : Venipuncture done in our office, Labs sent out.Patient prescribed Ceftin 250 mg twice daily #20/Flonase/Medrol Dosepak, viral swab sent and allergy blood work to be assessed.  Consider chest x-ray..Patient advised to rest/increase fluids and use supportive therapy. Patient will call if symptoms persist or worsen and return to the office in 4months\par \par \par Dr. Fairbanks was present in office building while I examined patient\par

## 2023-04-25 NOTE — PHYSICAL EXAM
[No Acute Distress] : no acute distress [No Respiratory Distress] : no respiratory distress  [Normal Rate] : normal rate  [Regular Rhythm] : with a regular rhythm [Normal Affect] : the affect was normal [Normal Mood] : the mood was normal [Normal Outer Ear/Nose] : the outer ears and nose were normal in appearance [Normal TMs] : both tympanic membranes were normal [Normal Nasal Mucosa] : the nasal mucosa was normal [Supple] : supple [de-identified] : +PND [de-identified] : + Occasional wheezing with cough noted

## 2023-04-26 ENCOUNTER — NON-APPOINTMENT (OUTPATIENT)
Age: 78
End: 2023-04-26

## 2023-04-26 LAB
BOXELDER IGE QN: <0.1 KUA/L
CEDAR IGE QN: <0.1 KUA/L
COCKSFOOT IGE QN: <0.1 KUA/L
COMMON RAGWEED IGE QN: <0.1 KUA/L
D FARINAE IGE QN: <0.1 KUA/L
D PTERONYSS IGE QN: <0.1 KUA/L
DEPRECATED BOXELDER IGE RAST QL: 0
DEPRECATED CEDAR IGE RAST QL: 0
DEPRECATED COCKSFOOT IGE RAST QL: 0
DEPRECATED COMMON RAGWEED IGE RAST QL: 0
DEPRECATED D FARINAE IGE RAST QL: 0
DEPRECATED D PTERONYSS IGE RAST QL: 0
DEPRECATED GIANT RAGWEED IGE RAST QL: 0
DEPRECATED HOUSE DUST IGE RAST QL: <0.1 KUA/L
DEPRECATED KENT BLUE GRASS IGE RAST QL: 0
DEPRECATED RED TOP GRASS IGE RAST QL: 0
DEPRECATED ROACH IGE RAST QL: 0
DEPRECATED SILVER BIRCH IGE RAST QL: 0
DEPRECATED TIMOTHY IGE RAST QL: 0
DEPRECATED WHITE HICKORY IGE RAST QL: 0
DEPRECATED WHITE OAK IGE RAST QL: 0
GIANT RAGWEED IGE QN: <0.1 KUA/L
HOUSE DUST AP IGE QN: 0
KENT BLUE GRASS IGE QN: <0.1 KUA/L
RED TOP GRASS IGE QN: <0.1 KUA/L
ROACH IGE QN: <0.1 KUA/L
SILVER BIRCH IGE QN: <0.1 KUA/L
TIMOTHY IGE QN: <0.1 KUA/L
WHITE HICKORY IGE QN: <0.1 KUA/L
WHITE OAK IGE QN: <0.1 KUA/L

## 2023-05-03 ENCOUNTER — RX RENEWAL (OUTPATIENT)
Age: 78
End: 2023-05-03

## 2023-05-15 ENCOUNTER — NON-APPOINTMENT (OUTPATIENT)
Age: 78
End: 2023-05-15

## 2023-05-15 ENCOUNTER — OFFICE (OUTPATIENT)
Dept: URBAN - METROPOLITAN AREA CLINIC 115 | Facility: CLINIC | Age: 78
Setting detail: OPHTHALMOLOGY
End: 2023-05-15
Payer: MEDICARE

## 2023-05-15 DIAGNOSIS — H01.001: ICD-10-CM

## 2023-05-15 DIAGNOSIS — H11.153: ICD-10-CM

## 2023-05-15 DIAGNOSIS — H35.033: ICD-10-CM

## 2023-05-15 DIAGNOSIS — Z96.1: ICD-10-CM

## 2023-05-15 DIAGNOSIS — H01.004: ICD-10-CM

## 2023-05-15 PROCEDURE — 92014 COMPRE OPH EXAM EST PT 1/>: CPT | Performed by: OPHTHALMOLOGY

## 2023-05-15 ASSESSMENT — SPHEQUIV_DERIVED
OS_SPHEQUIV: 0.25
OD_SPHEQUIV: 0.375

## 2023-05-15 ASSESSMENT — REFRACTION_AUTOREFRACTION
OS_CYLINDER: -1.00
OD_AXIS: 088
OS_AXIS: 076
OS_SPHERE: +0.75
OD_CYLINDER: -1.75
OD_SPHERE: +1.25

## 2023-05-15 ASSESSMENT — REFRACTION_CURRENTRX
OS_ADD: +2.75
OS_AXIS: 090
OD_AXIS: 079
OS_OVR_VA: 20/
OS_SPHERE: PLANO
OD_VPRISM_DIRECTION: PROGS
OD_ADD: +2.75
OD_SPHERE: +0.75
OS_CYLINDER: -0.50
OS_VPRISM_DIRECTION: PROGS
OD_CYLINDER: -1.50
OD_OVR_VA: 20/

## 2023-05-15 ASSESSMENT — TONOMETRY
OS_IOP_MMHG: 10
OD_IOP_MMHG: 10

## 2023-05-15 ASSESSMENT — CONFRONTATIONAL VISUAL FIELD TEST (CVF)
OD_FINDINGS: FULL
OS_FINDINGS: FULL

## 2023-05-15 ASSESSMENT — VISUAL ACUITY
OS_BCVA: 20/30-
OD_BCVA: 20/25

## 2023-05-15 ASSESSMENT — LID EXAM ASSESSMENTS
OS_BLEPHARITIS: LUL T
OD_BLEPHARITIS: RUL T

## 2023-05-30 ENCOUNTER — NON-APPOINTMENT (OUTPATIENT)
Age: 78
End: 2023-05-30

## 2023-06-15 ENCOUNTER — NON-APPOINTMENT (OUTPATIENT)
Age: 78
End: 2023-06-15

## 2023-07-21 ENCOUNTER — NON-APPOINTMENT (OUTPATIENT)
Age: 78
End: 2023-07-21

## 2023-08-24 ENCOUNTER — NON-APPOINTMENT (OUTPATIENT)
Age: 78
End: 2023-08-24

## 2023-09-18 ENCOUNTER — APPOINTMENT (OUTPATIENT)
Dept: INTERNAL MEDICINE | Facility: CLINIC | Age: 78
End: 2023-09-18

## 2023-10-23 ENCOUNTER — RX RENEWAL (OUTPATIENT)
Age: 78
End: 2023-10-23

## 2023-10-30 ENCOUNTER — RX RENEWAL (OUTPATIENT)
Age: 78
End: 2023-10-30

## 2023-10-30 ENCOUNTER — APPOINTMENT (OUTPATIENT)
Dept: INTERNAL MEDICINE | Facility: CLINIC | Age: 78
End: 2023-10-30
Payer: MEDICARE

## 2023-10-30 VITALS
HEIGHT: 70 IN | RESPIRATION RATE: 13 BRPM | WEIGHT: 197 LBS | HEART RATE: 80 BPM | OXYGEN SATURATION: 97 % | SYSTOLIC BLOOD PRESSURE: 120 MMHG | BODY MASS INDEX: 28.2 KG/M2 | DIASTOLIC BLOOD PRESSURE: 74 MMHG

## 2023-10-30 DIAGNOSIS — Z12.5 ENCOUNTER FOR SCREENING FOR MALIGNANT NEOPLASM OF PROSTATE: ICD-10-CM

## 2023-10-30 PROCEDURE — 99214 OFFICE O/P EST MOD 30 MIN: CPT | Mod: 25

## 2023-10-30 PROCEDURE — 36415 COLL VENOUS BLD VENIPUNCTURE: CPT

## 2023-10-30 RX ORDER — CEFUROXIME AXETIL 250 MG/1
250 TABLET ORAL
Qty: 20 | Refills: 0 | Status: DISCONTINUED | COMMUNITY
Start: 2023-04-24 | End: 2023-10-30

## 2023-10-30 RX ORDER — METHYLPREDNISOLONE 4 MG/1
4 TABLET ORAL
Qty: 1 | Refills: 0 | Status: DISCONTINUED | COMMUNITY
Start: 2023-04-24 | End: 2023-10-30

## 2023-11-01 LAB
ALBUMIN SERPL ELPH-MCNC: 4.5 G/DL
ALP BLD-CCNC: 68 U/L
ALT SERPL-CCNC: 30 U/L
ANION GAP SERPL CALC-SCNC: 15 MMOL/L
AST SERPL-CCNC: 19 U/L
BASOPHILS # BLD AUTO: 0.03 K/UL
BASOPHILS NFR BLD AUTO: 0.5 %
BILIRUB SERPL-MCNC: 0.4 MG/DL
BUN SERPL-MCNC: 23 MG/DL
CALCIUM SERPL-MCNC: 9.6 MG/DL
CHLORIDE SERPL-SCNC: 102 MMOL/L
CHOLEST SERPL-MCNC: 141 MG/DL
CO2 SERPL-SCNC: 22 MMOL/L
CREAT SERPL-MCNC: 1.37 MG/DL
EGFR: 53 ML/MIN/1.73M2
EOSINOPHIL # BLD AUTO: 0.19 K/UL
EOSINOPHIL NFR BLD AUTO: 3 %
ESTIMATED AVERAGE GLUCOSE: 140 MG/DL
GLUCOSE SERPL-MCNC: 88 MG/DL
HBA1C MFR BLD HPLC: 6.5 %
HCT VFR BLD CALC: 41.1 %
HDLC SERPL-MCNC: 41 MG/DL
HGB BLD-MCNC: 13.2 G/DL
IMM GRANULOCYTES NFR BLD AUTO: 1.3 %
LDLC SERPL CALC-MCNC: 76 MG/DL
LYMPHOCYTES # BLD AUTO: 1.68 K/UL
LYMPHOCYTES NFR BLD AUTO: 26.5 %
MAN DIFF?: NORMAL
MCHC RBC-ENTMCNC: 30.8 PG
MCHC RBC-ENTMCNC: 32.1 GM/DL
MCV RBC AUTO: 95.8 FL
MONOCYTES # BLD AUTO: 0.53 K/UL
MONOCYTES NFR BLD AUTO: 8.3 %
NEUTROPHILS # BLD AUTO: 3.84 K/UL
NEUTROPHILS NFR BLD AUTO: 60.4 %
NONHDLC SERPL-MCNC: 100 MG/DL
PLATELET # BLD AUTO: 199 K/UL
POTASSIUM SERPL-SCNC: 4.3 MMOL/L
PROT SERPL-MCNC: 7.1 G/DL
RBC # BLD: 4.29 M/UL
RBC # FLD: 13.9 %
SODIUM SERPL-SCNC: 139 MMOL/L
TRIGL SERPL-MCNC: 141 MG/DL
TSH SERPL-ACNC: 5.61 UIU/ML
WBC # FLD AUTO: 6.35 K/UL

## 2023-11-24 ENCOUNTER — TRANSCRIPTION ENCOUNTER (OUTPATIENT)
Age: 78
End: 2023-11-24

## 2023-11-24 ENCOUNTER — INPATIENT (INPATIENT)
Facility: HOSPITAL | Age: 78
LOS: 3 days | Discharge: HOME | DRG: 300 | End: 2023-11-28
Attending: HOSPITALIST | Admitting: HOSPITALIST
Payer: MEDICARE

## 2023-11-24 VITALS
TEMPERATURE: 98 F | SYSTOLIC BLOOD PRESSURE: 128 MMHG | HEART RATE: 52 BPM | WEIGHT: 211.64 LBS | HEIGHT: 72 IN | OXYGEN SATURATION: 98 % | DIASTOLIC BLOOD PRESSURE: 58 MMHG | RESPIRATION RATE: 20 BRPM

## 2023-11-24 DIAGNOSIS — I80.10 PHLEBITIS AND THROMBOPHLEBITIS OF UNSPECIFIED FEMORAL VEIN: ICD-10-CM

## 2023-11-24 DIAGNOSIS — I82.409 ACUTE EMBOLISM AND THROMBOSIS OF UNSPECIFIED DEEP VEINS OF UNSPECIFIED LOWER EXTREMITY: ICD-10-CM

## 2023-11-24 LAB
ALBUMIN SERPL ELPH-MCNC: 4.6 G/DL — SIGNIFICANT CHANGE UP (ref 3.3–5.2)
ALBUMIN SERPL ELPH-MCNC: 4.6 G/DL — SIGNIFICANT CHANGE UP (ref 3.3–5.2)
ALP SERPL-CCNC: 58 U/L — SIGNIFICANT CHANGE UP (ref 40–120)
ALP SERPL-CCNC: 58 U/L — SIGNIFICANT CHANGE UP (ref 40–120)
ALT FLD-CCNC: 25 U/L — SIGNIFICANT CHANGE UP
ALT FLD-CCNC: 25 U/L — SIGNIFICANT CHANGE UP
ANION GAP SERPL CALC-SCNC: 13 MMOL/L — SIGNIFICANT CHANGE UP (ref 5–17)
ANION GAP SERPL CALC-SCNC: 13 MMOL/L — SIGNIFICANT CHANGE UP (ref 5–17)
APTT BLD: 29.9 SEC — SIGNIFICANT CHANGE UP (ref 24.5–35.6)
APTT BLD: 29.9 SEC — SIGNIFICANT CHANGE UP (ref 24.5–35.6)
AST SERPL-CCNC: 27 U/L — SIGNIFICANT CHANGE UP
AST SERPL-CCNC: 27 U/L — SIGNIFICANT CHANGE UP
BASOPHILS # BLD AUTO: 0.06 K/UL — SIGNIFICANT CHANGE UP (ref 0–0.2)
BASOPHILS # BLD AUTO: 0.06 K/UL — SIGNIFICANT CHANGE UP (ref 0–0.2)
BASOPHILS NFR BLD AUTO: 0.9 % — SIGNIFICANT CHANGE UP (ref 0–2)
BASOPHILS NFR BLD AUTO: 0.9 % — SIGNIFICANT CHANGE UP (ref 0–2)
BILIRUB SERPL-MCNC: 0.3 MG/DL — LOW (ref 0.4–2)
BILIRUB SERPL-MCNC: 0.3 MG/DL — LOW (ref 0.4–2)
BUN SERPL-MCNC: 27.9 MG/DL — HIGH (ref 8–20)
BUN SERPL-MCNC: 27.9 MG/DL — HIGH (ref 8–20)
CALCIUM SERPL-MCNC: 9.2 MG/DL — SIGNIFICANT CHANGE UP (ref 8.4–10.5)
CALCIUM SERPL-MCNC: 9.2 MG/DL — SIGNIFICANT CHANGE UP (ref 8.4–10.5)
CHLORIDE SERPL-SCNC: 100 MMOL/L — SIGNIFICANT CHANGE UP (ref 96–108)
CHLORIDE SERPL-SCNC: 100 MMOL/L — SIGNIFICANT CHANGE UP (ref 96–108)
CO2 SERPL-SCNC: 24 MMOL/L — SIGNIFICANT CHANGE UP (ref 22–29)
CO2 SERPL-SCNC: 24 MMOL/L — SIGNIFICANT CHANGE UP (ref 22–29)
CREAT SERPL-MCNC: 1.51 MG/DL — HIGH (ref 0.5–1.3)
CREAT SERPL-MCNC: 1.51 MG/DL — HIGH (ref 0.5–1.3)
EGFR: 47 ML/MIN/1.73M2 — LOW
EGFR: 47 ML/MIN/1.73M2 — LOW
EOSINOPHIL # BLD AUTO: 0.36 K/UL — SIGNIFICANT CHANGE UP (ref 0–0.5)
EOSINOPHIL # BLD AUTO: 0.36 K/UL — SIGNIFICANT CHANGE UP (ref 0–0.5)
EOSINOPHIL NFR BLD AUTO: 5.3 % — SIGNIFICANT CHANGE UP (ref 0–6)
EOSINOPHIL NFR BLD AUTO: 5.3 % — SIGNIFICANT CHANGE UP (ref 0–6)
GLUCOSE SERPL-MCNC: 94 MG/DL — SIGNIFICANT CHANGE UP (ref 70–99)
GLUCOSE SERPL-MCNC: 94 MG/DL — SIGNIFICANT CHANGE UP (ref 70–99)
HCT VFR BLD CALC: 40.9 % — SIGNIFICANT CHANGE UP (ref 39–50)
HCT VFR BLD CALC: 40.9 % — SIGNIFICANT CHANGE UP (ref 39–50)
HGB BLD-MCNC: 13.6 G/DL — SIGNIFICANT CHANGE UP (ref 13–17)
HGB BLD-MCNC: 13.6 G/DL — SIGNIFICANT CHANGE UP (ref 13–17)
IMM GRANULOCYTES NFR BLD AUTO: 0.4 % — SIGNIFICANT CHANGE UP (ref 0–0.9)
IMM GRANULOCYTES NFR BLD AUTO: 0.4 % — SIGNIFICANT CHANGE UP (ref 0–0.9)
INR BLD: 1.02 RATIO — SIGNIFICANT CHANGE UP (ref 0.85–1.18)
INR BLD: 1.02 RATIO — SIGNIFICANT CHANGE UP (ref 0.85–1.18)
LYMPHOCYTES # BLD AUTO: 1.59 K/UL — SIGNIFICANT CHANGE UP (ref 1–3.3)
LYMPHOCYTES # BLD AUTO: 1.59 K/UL — SIGNIFICANT CHANGE UP (ref 1–3.3)
LYMPHOCYTES # BLD AUTO: 23.5 % — SIGNIFICANT CHANGE UP (ref 13–44)
LYMPHOCYTES # BLD AUTO: 23.5 % — SIGNIFICANT CHANGE UP (ref 13–44)
MCHC RBC-ENTMCNC: 31.2 PG — SIGNIFICANT CHANGE UP (ref 27–34)
MCHC RBC-ENTMCNC: 31.2 PG — SIGNIFICANT CHANGE UP (ref 27–34)
MCHC RBC-ENTMCNC: 33.3 GM/DL — SIGNIFICANT CHANGE UP (ref 32–36)
MCHC RBC-ENTMCNC: 33.3 GM/DL — SIGNIFICANT CHANGE UP (ref 32–36)
MCV RBC AUTO: 93.8 FL — SIGNIFICANT CHANGE UP (ref 80–100)
MCV RBC AUTO: 93.8 FL — SIGNIFICANT CHANGE UP (ref 80–100)
MONOCYTES # BLD AUTO: 0.78 K/UL — SIGNIFICANT CHANGE UP (ref 0–0.9)
MONOCYTES # BLD AUTO: 0.78 K/UL — SIGNIFICANT CHANGE UP (ref 0–0.9)
MONOCYTES NFR BLD AUTO: 11.5 % — SIGNIFICANT CHANGE UP (ref 2–14)
MONOCYTES NFR BLD AUTO: 11.5 % — SIGNIFICANT CHANGE UP (ref 2–14)
NEUTROPHILS # BLD AUTO: 3.94 K/UL — SIGNIFICANT CHANGE UP (ref 1.8–7.4)
NEUTROPHILS # BLD AUTO: 3.94 K/UL — SIGNIFICANT CHANGE UP (ref 1.8–7.4)
NEUTROPHILS NFR BLD AUTO: 58.4 % — SIGNIFICANT CHANGE UP (ref 43–77)
NEUTROPHILS NFR BLD AUTO: 58.4 % — SIGNIFICANT CHANGE UP (ref 43–77)
PLATELET # BLD AUTO: 210 K/UL — SIGNIFICANT CHANGE UP (ref 150–400)
PLATELET # BLD AUTO: 210 K/UL — SIGNIFICANT CHANGE UP (ref 150–400)
POTASSIUM SERPL-MCNC: 4.4 MMOL/L — SIGNIFICANT CHANGE UP (ref 3.5–5.3)
POTASSIUM SERPL-MCNC: 4.4 MMOL/L — SIGNIFICANT CHANGE UP (ref 3.5–5.3)
POTASSIUM SERPL-SCNC: 4.4 MMOL/L — SIGNIFICANT CHANGE UP (ref 3.5–5.3)
POTASSIUM SERPL-SCNC: 4.4 MMOL/L — SIGNIFICANT CHANGE UP (ref 3.5–5.3)
PROT SERPL-MCNC: 7.4 G/DL — SIGNIFICANT CHANGE UP (ref 6.6–8.7)
PROT SERPL-MCNC: 7.4 G/DL — SIGNIFICANT CHANGE UP (ref 6.6–8.7)
PROTHROM AB SERPL-ACNC: 11.3 SEC — SIGNIFICANT CHANGE UP (ref 9.5–13)
PROTHROM AB SERPL-ACNC: 11.3 SEC — SIGNIFICANT CHANGE UP (ref 9.5–13)
RBC # BLD: 4.36 M/UL — SIGNIFICANT CHANGE UP (ref 4.2–5.8)
RBC # BLD: 4.36 M/UL — SIGNIFICANT CHANGE UP (ref 4.2–5.8)
RBC # FLD: 13.8 % — SIGNIFICANT CHANGE UP (ref 10.3–14.5)
RBC # FLD: 13.8 % — SIGNIFICANT CHANGE UP (ref 10.3–14.5)
SODIUM SERPL-SCNC: 137 MMOL/L — SIGNIFICANT CHANGE UP (ref 135–145)
SODIUM SERPL-SCNC: 137 MMOL/L — SIGNIFICANT CHANGE UP (ref 135–145)
WBC # BLD: 6.76 K/UL — SIGNIFICANT CHANGE UP (ref 3.8–10.5)
WBC # BLD: 6.76 K/UL — SIGNIFICANT CHANGE UP (ref 3.8–10.5)
WBC # FLD AUTO: 6.76 K/UL — SIGNIFICANT CHANGE UP (ref 3.8–10.5)
WBC # FLD AUTO: 6.76 K/UL — SIGNIFICANT CHANGE UP (ref 3.8–10.5)

## 2023-11-24 PROCEDURE — 99223 1ST HOSP IP/OBS HIGH 75: CPT

## 2023-11-24 PROCEDURE — 99285 EMERGENCY DEPT VISIT HI MDM: CPT | Mod: FS

## 2023-11-24 PROCEDURE — 93971 EXTREMITY STUDY: CPT | Mod: 26,LT

## 2023-11-24 PROCEDURE — 73564 X-RAY EXAM KNEE 4 OR MORE: CPT | Mod: 26,LT

## 2023-11-24 PROCEDURE — 99497 ADVNCD CARE PLAN 30 MIN: CPT | Mod: 25

## 2023-11-24 RX ORDER — ACETAMINOPHEN 500 MG
1000 TABLET ORAL ONCE
Refills: 0 | Status: COMPLETED | OUTPATIENT
Start: 2023-11-24 | End: 2023-11-24

## 2023-11-24 RX ORDER — LEVOTHYROXINE SODIUM 125 MCG
150 TABLET ORAL DAILY
Refills: 0 | Status: DISCONTINUED | OUTPATIENT
Start: 2023-11-24 | End: 2023-11-28

## 2023-11-24 RX ORDER — HEPARIN SODIUM 5000 [USP'U]/ML
4000 INJECTION INTRAVENOUS; SUBCUTANEOUS EVERY 6 HOURS
Refills: 0 | Status: DISCONTINUED | OUTPATIENT
Start: 2023-11-24 | End: 2023-11-24

## 2023-11-24 RX ORDER — HEPARIN SODIUM 5000 [USP'U]/ML
4000 INJECTION INTRAVENOUS; SUBCUTANEOUS EVERY 6 HOURS
Refills: 0 | Status: DISCONTINUED | OUTPATIENT
Start: 2023-11-24 | End: 2023-11-28

## 2023-11-24 RX ORDER — VALSARTAN 80 MG/1
320 TABLET ORAL DAILY
Refills: 0 | Status: DISCONTINUED | OUTPATIENT
Start: 2023-11-24 | End: 2023-11-28

## 2023-11-24 RX ORDER — LEVOTHYROXINE SODIUM 125 MCG
1 TABLET ORAL
Qty: 0 | Refills: 0 | DISCHARGE

## 2023-11-24 RX ORDER — VALSARTAN 80 MG/1
0 TABLET ORAL
Qty: 0 | Refills: 0 | DISCHARGE

## 2023-11-24 RX ORDER — ATORVASTATIN CALCIUM 80 MG/1
20 TABLET, FILM COATED ORAL AT BEDTIME
Refills: 0 | Status: DISCONTINUED | OUTPATIENT
Start: 2023-11-24 | End: 2023-11-28

## 2023-11-24 RX ORDER — ONDANSETRON 8 MG/1
4 TABLET, FILM COATED ORAL EVERY 8 HOURS
Refills: 0 | Status: DISCONTINUED | OUTPATIENT
Start: 2023-11-24 | End: 2023-11-28

## 2023-11-24 RX ORDER — HEPARIN SODIUM 5000 [USP'U]/ML
INJECTION INTRAVENOUS; SUBCUTANEOUS
Qty: 25000 | Refills: 0 | Status: DISCONTINUED | OUTPATIENT
Start: 2023-11-24 | End: 2023-11-24

## 2023-11-24 RX ORDER — ACETAMINOPHEN 500 MG
650 TABLET ORAL EVERY 6 HOURS
Refills: 0 | Status: DISCONTINUED | OUTPATIENT
Start: 2023-11-24 | End: 2023-11-28

## 2023-11-24 RX ORDER — HEPARIN SODIUM 5000 [USP'U]/ML
INJECTION INTRAVENOUS; SUBCUTANEOUS
Qty: 25000 | Refills: 0 | Status: DISCONTINUED | OUTPATIENT
Start: 2023-11-24 | End: 2023-11-27

## 2023-11-24 RX ORDER — METOPROLOL TARTRATE 50 MG
25 TABLET ORAL
Qty: 0 | Refills: 0 | DISCHARGE

## 2023-11-24 RX ORDER — LANOLIN ALCOHOL/MO/W.PET/CERES
3 CREAM (GRAM) TOPICAL AT BEDTIME
Refills: 0 | Status: DISCONTINUED | OUTPATIENT
Start: 2023-11-24 | End: 2023-11-28

## 2023-11-24 RX ORDER — HEPARIN SODIUM 5000 [USP'U]/ML
8000 INJECTION INTRAVENOUS; SUBCUTANEOUS EVERY 6 HOURS
Refills: 0 | Status: DISCONTINUED | OUTPATIENT
Start: 2023-11-24 | End: 2023-11-24

## 2023-11-24 RX ORDER — LEVOTHYROXINE SODIUM 125 MCG
1 TABLET ORAL
Refills: 0 | DISCHARGE

## 2023-11-24 RX ORDER — ASPIRIN/CALCIUM CARB/MAGNESIUM 324 MG
81 TABLET ORAL DAILY
Refills: 0 | Status: DISCONTINUED | OUTPATIENT
Start: 2023-11-24 | End: 2023-11-28

## 2023-11-24 RX ORDER — HEPARIN SODIUM 5000 [USP'U]/ML
8000 INJECTION INTRAVENOUS; SUBCUTANEOUS EVERY 6 HOURS
Refills: 0 | Status: DISCONTINUED | OUTPATIENT
Start: 2023-11-24 | End: 2023-11-28

## 2023-11-24 RX ORDER — LABETALOL HCL 100 MG
100 TABLET ORAL
Refills: 0 | Status: DISCONTINUED | OUTPATIENT
Start: 2023-11-24 | End: 2023-11-27

## 2023-11-24 RX ORDER — HEPARIN SODIUM 5000 [USP'U]/ML
8000 INJECTION INTRAVENOUS; SUBCUTANEOUS ONCE
Refills: 0 | Status: DISCONTINUED | OUTPATIENT
Start: 2023-11-24 | End: 2023-11-24

## 2023-11-24 RX ORDER — HEPARIN SODIUM 5000 [USP'U]/ML
8000 INJECTION INTRAVENOUS; SUBCUTANEOUS ONCE
Refills: 0 | Status: COMPLETED | OUTPATIENT
Start: 2023-11-24 | End: 2023-11-24

## 2023-11-24 RX ADMIN — HEPARIN SODIUM 1800 UNIT(S)/HR: 5000 INJECTION INTRAVENOUS; SUBCUTANEOUS at 20:19

## 2023-11-24 RX ADMIN — Medication 400 MILLIGRAM(S): at 22:02

## 2023-11-24 RX ADMIN — Medication 1000 MILLIGRAM(S): at 23:02

## 2023-11-24 RX ADMIN — Medication 3 MILLIGRAM(S): at 22:03

## 2023-11-24 RX ADMIN — HEPARIN SODIUM 8000 UNIT(S): 5000 INJECTION INTRAVENOUS; SUBCUTANEOUS at 20:19

## 2023-11-24 NOTE — PATIENT PROFILE ADULT - FALL HARM RISK - HARM RISK INTERVENTIONS

## 2023-11-24 NOTE — CONSULT NOTE ADULT - ASSESSMENT
ASSESSMENT: 77yo M w/ hx of prothrombin gene disorder presenting with L femoral DVT.     PLAN:  - recommend medicine evaluation, heme onc evaluation  - recommend hep gtt  - vascular will follow    Pt seen and plan discussed with attending, Dr. Umana

## 2023-11-24 NOTE — H&P ADULT - NSICDXFAMILYHX_GEN_ALL_CORE_FT
FAMILY HISTORY:  Mother  Still living? Unknown  FH: heart disease, Age at diagnosis: Age Unknown    Child  Still living? Unknown  Family history of clotting disorder, Age at diagnosis: Age Unknown

## 2023-11-24 NOTE — ED PROVIDER NOTE - ATTENDING CONTRIBUTION TO CARE
I agree with the PA's note and was available for any issues/concerns. I was directly involved in patient care. My brief overall assessment is as follows:   I, Claire Uriostegui, have personally seen and examined this patient. I have fully participated in the care of this patient. I have reviewed all pertinent clinical information, including history, physical exam, plan and the Medical Student's note and agree except as noted below.     Patient with history of clotting disorder on Eliquis daily has not missed a dose complaining of knee pain mostly around the kneecap mild ankle swelling.  Normal pulses normal sensation normal strength.      Duplex ordered noted to have acute on chronic DVT.  Patient is already on a DOAC.  labs without significant abnormalities will start heparin drip will admit for management with recommendations from heme and vascular

## 2023-11-24 NOTE — ED PROVIDER NOTE - CLINICAL SUMMARY MEDICAL DECISION MAKING FREE TEXT BOX
77 y/o male with pmhx of HTN, HLD, DVT on Eliquis, hypothyroidism presenting to ER with c/o left knee pain starting today, spontaneous onset, circumferential to kneecap. Atraumatic in origin, denies falls or injury. Reports the pain is localized and non-radiating. - popliteal tenderness. Reports the pain worsens upon flexion, extension and upon weight bearing. Denies SOB at rest or upon exertion or CP.    Plan for US Duplex r/o DVT 77 y/o male with pmhx of HTN, HLD, DVT on Eliquis, hypothyroidism presenting to ER with c/o left knee pain starting today, spontaneous onset, circumferential to kneecap. Atraumatic in origin, denies falls or injury. Reports the pain is localized and non-radiating. - popliteal tenderness. Reports the pain worsens upon flexion, extension and upon weight bearing. Denies SOB at rest or upon exertion or CP.    Plan for US Duplex r/o DVT    Us shows Acute left lower extremity DVT above the knee involving the left femoral vein. Chronic venous changes of the left popliteal vein. pt has been on eliquis, complaint with eliquis, will start on heparin, admit to medicine, vascular following

## 2023-11-24 NOTE — H&P ADULT - HISTORY OF PRESENT ILLNESS
77 yo female with pmhx HTN, HLD, DVT on Eliquis since 2017, Prothrombin gene mutation, hypothyroidism presenting to the ED with complaint of left knee pain for 1 day. 79 yo female with pmhx HTN, HLD, DVT on Eliquis since 2017, Prothrombin gene mutation, hypothyroidism presenting to the ED with complaint of left knee pain for 1 day. He did not have any trauma to the area. He noted that it felt similar to his previous DVT in 2017 so he presented to the hospital. He was initially given Eliquis after his first DVT/PE, was taking 5 mg BID. However, for the past few years he has been taking 2.5 mg BID. He states the dose was decreased by his cardiologist, but he is not sure why. He denies any SOB, palpitations, chest pain, or other associated symptoms.

## 2023-11-24 NOTE — ED PROVIDER NOTE - OBJECTIVE STATEMENT
77 y/o male with pmhx of HTN, HLD, DVT on Eliquis, hypothyroidism presenting to ER with c/o left knee pain starting today described as sharp and intermittent. Hx DVT in June 2017. Reports he has a clotting disorder but unsure of which. Patient reports the pain is circumferential to kneecap. Atraumatic in origin, denies falls or injury. Pain is localized and non-radiating. Denies pain behind the knee. Pain provoked by flexion, extension and upon weight bearing. Denies SOB at rest or upon exertion or CP. Has been self-medicated with around the clock Tylenol for pain.

## 2023-11-24 NOTE — CONSULT NOTE ADULT - SUBJECTIVE AND OBJECTIVE BOX
HPI: 79yo M w/ hx of prothrombin gene mutation and CAD presenting with femoral DVT. Pt states that he has a hx of prothrombin gene mutation for which he has been taking Eliquis since 2017. Had one DVT in 2017 and has been compliant with Eliquis since then. Pt states that he had pain in the RLE prompting him to come to the ED. Duplex u/s demonstrated noncompressibility of femoral vein consistent with DVT. No phlegmasia, mild swelling present in LLE.     Vitals:  T(C): 36.7 (11-24-23 @ 14:29), Max: 36.7 (11-24-23 @ 14:29)  T(F): 98 (11-24-23 @ 14:29), Max: 98 (11-24-23 @ 14:29)  HR: 52 (11-24-23 @ 14:29) (52 - 52)  BP: 128/58 (11-24-23 @ 14:29) (128/58 - 128/58)  ABP: --  ABP(mean): --  RR: 20 (11-24-23 @ 14:29) (20 - 20)  SpO2: 98% (11-24-23 @ 14:29) (98% - 98%)      LABS:  cret                        13.6   6.76  )-----------( 210      ( 24 Nov 2023 17:15 )             40.9     11-24    137  |  100  |  27.9<H>  ----------------------------<  94  4.4   |  24.0  |  1.51<H>    Ca    9.2      24 Nov 2023 17:15    TPro  7.4  /  Alb  4.6  /  TBili  0.3<L>  /  DBili  x   /  AST  27  /  ALT  25  /  AlkPhos  58  11-24    PT/INR - ( 24 Nov 2023 17:15 )   PT: 11.3 sec;   INR: 1.02 ratio         PTT - ( 24 Nov 2023 17:15 )  PTT:29.9 sec    GENERAL: NAD, sitting comfortably  HEAD:  Atraumatic, normocephalic  NECK: Supple, no JVD  HEART: RRR  LUNGS: Unlabored respirations. No conversational dysnpea.   ABDOMEN: Soft, nontender, nondistended  EXTREMITIES: 2+ peripheral pulses bilaterally. Mild swelling of LLE. No phlegmasia. No skin changes. Mild tenderness.  NERVOUS SYSTEM:  A&Ox3, no focal deficits   SKIN: No rashes or lesions

## 2023-11-24 NOTE — H&P ADULT - NSHPOUTPATIENTPROVIDERS_GEN_ALL_CORE
PCP: Dr. Fairbanks PCP: Dr. Fairbanks  Cardio: Dr. Matias PCP: Dr. Fairbanks  Cardio: Dr. Matias  Heme: Dr. Liu

## 2023-11-24 NOTE — H&P ADULT - NSICDXPASTMEDICALHX_GEN_ALL_CORE_FT
PAST MEDICAL HISTORY:  DVT of lower limb, acute     HLD (hyperlipidemia)     HTN (hypertension)     Hypothyroid

## 2023-11-24 NOTE — H&P ADULT - NSHPPHYSICALEXAM_GEN_ALL_CORE
Vital Signs Last 24 Hrs  T(C): 36.4 (24 Nov 2023 23:11), Max: 36.7 (24 Nov 2023 14:29)  T(F): 97.6 (24 Nov 2023 23:11), Max: 98 (24 Nov 2023 14:29)  HR: 60 (24 Nov 2023 23:11) (52 - 61)  BP: 118/60 (24 Nov 2023 23:11) (118/60 - 156/73)  BP(mean): --  RR: 19 (24 Nov 2023 23:11) (19 - 20)  SpO2: 96% (24 Nov 2023 23:11) (95% - 98%)    Parameters below as of 24 Nov 2023 23:11  Patient On (Oxygen Delivery Method): room air    General: Age-appearing, in no acute distress  Head: Normocephalic, atraumatic  ENMT: EOMI, neck supple  Cardiovascular: +S1, S2; Regular rate and rhythm, no murmurs, rubs, gallops  Respiratory: CTA BL, no wheezes, rales, rhonchi  Gastrointestinal: Abdomen soft, non-tender, +BS in all 4 quadrants  Extremities: No clubbing, cyanosis, or edema  Vascular: 2+ pulses, cap refill < 2 seconds  Neuro: Non-focal, AAOx4, sensation intact BL  Musculoskeletal: Normal tone, no deformities  Skin: Warm, dry; no acute rash seen  Psych: Appropriate, cooperative

## 2023-11-24 NOTE — H&P ADULT - ASSESSMENT
77 yo female with pmhx HTN, HLD, DVT on Eliquis since 2017, Prothrombin gene mutation, hypothyroidism presenting to the ED with complaint of left knee pain for 1 day.    Acute DVT, failed Eliquis; Prothrombin gene mutation  -Admit to medicine  -Patient has been compliant with Eliquis without any missed doses   -Heparin gtt  -Vascular surgery following  -Consult heme for failed Eliquis treatment    HTN, HLD      Hypothyroidism      DVTppx:  GOC: 79 yo female with pmhx HTN, HLD, DVT on Eliquis since 2017, Prothrombin gene mutation, hypothyroidism presenting to the ED with complaint of left knee pain for 1 day.    Acute DVT; Prothrombin gene mutation  -Admit to medicine  -Patient has been compliant with Eliquis without any missed doses, however, he has been taking the 2.5 dose and does not seem to meet criteria for reduced dose so perhaps has been on a subtherapeutic dose causing recurrence of DVT  -Heparin gtt for now and hold Eliquis  -Vascular surgery following  -Consult heme for recommendations    HTN, HLD  -Continue Valsartan 320, HCTZ 12.5, Labetalol 100 mg BID (14:00 and 21:00)   -Continue statin- Atorvastatin as formulary sub for Pitavastatin  -Continue Aspirin 81 mg     Hypothyroidism  -Continue Synthroid 150 mcg daily    DVTppx: Heparin gtt  GOC: Full Code

## 2023-11-24 NOTE — ED ADULT NURSE NOTE - NSFALLUNIVINTERV_ED_ALL_ED
Bed/Stretcher in lowest position, wheels locked, appropriate side rails in place/Call bell, personal items and telephone in reach/Instruct patient to call for assistance before getting out of bed/chair/stretcher/Non-slip footwear applied when patient is off stretcher/[place] to call system/Physically safe environment - no spills, clutter or unnecessary equipment/Purposeful proactive rounding/Room/bathroom lighting operational, light cord in reach

## 2023-11-24 NOTE — H&P ADULT - NSHPLABSRESULTS_GEN_ALL_CORE
13.6   6.76  )-----------( 210      ( 24 Nov 2023 17:15 )             40.9     24 Nov 2023 17:15    137    |  100    |  27.9   ----------------------------<  94     4.4     |  24.0   |  1.51     Ca    9.2        24 Nov 2023 17:15    TPro  7.4    /  Alb  4.6    /  TBili  0.3    /  DBili  x      /  AST  27     /  ALT  25     /  AlkPhos  58     24 Nov 2023 17:15    PT/INR - ( 24 Nov 2023 17:15 )   PT: 11.3 sec;   INR: 1.02 ratio         PTT - ( 24 Nov 2023 17:15 )  PTT:29.9 sec  CAPILLARY BLOOD GLUCOSE        LIVER FUNCTIONS - ( 24 Nov 2023 17:15 )  Alb: 4.6 g/dL / Pro: 7.4 g/dL / ALK PHOS: 58 U/L / ALT: 25 U/L / AST: 27 U/L / GGT: x           Urinalysis Basic - ( 24 Nov 2023 17:15 )    Color: x / Appearance: x / SG: x / pH: x  Gluc: 94 mg/dL / Ketone: x  / Bili: x / Urobili: x   Blood: x / Protein: x / Nitrite: x   Leuk Esterase: x / RBC: x / WBC x   Sq Epi: x / Non Sq Epi: x / Bacteria: x        < from: US Duplex Venous Lower Ext Ltd, Left (11.24.23 @ 16:23) >    IMPRESSION:    Acute left lower extremity DVT above the knee involving the left femoral   vein.    Chronic venous changes of the left popliteal vein.    Dr. Edmond discussed these findings with CATE Staples from ER on   11/24/2023 at 4:40 PM, with read back.

## 2023-11-24 NOTE — ED PROVIDER NOTE - NS ED ATTENDING STATEMENT MOD
I have seen and examined this patient and fully participated in the care of this patient as the teaching attending.  The service was shared with the [facility].  I reviewed and verified the documentation and independently performed the documented:

## 2023-11-24 NOTE — ED ADULT NURSE NOTE - OBJECTIVE STATEMENT
pt. is aaox4. pt. states that he started having left leg pain, starting at the knee going up to the groin. states he had similar pain when he had a previous dvt. right knee is slightly reddened and slightly swollen. iv access obtained and bloods drawn. spouse at bedside. safety maintained.

## 2023-11-25 LAB
ANION GAP SERPL CALC-SCNC: 11 MMOL/L — SIGNIFICANT CHANGE UP (ref 5–17)
ANION GAP SERPL CALC-SCNC: 11 MMOL/L — SIGNIFICANT CHANGE UP (ref 5–17)
APTT BLD: 140.1 SEC — CRITICAL HIGH (ref 24.5–35.6)
APTT BLD: 140.1 SEC — CRITICAL HIGH (ref 24.5–35.6)
APTT BLD: 75.6 SEC — HIGH (ref 24.5–35.6)
APTT BLD: 75.6 SEC — HIGH (ref 24.5–35.6)
APTT BLD: >200 SEC — CRITICAL HIGH (ref 24.5–35.6)
APTT BLD: >200 SEC — CRITICAL HIGH (ref 24.5–35.6)
BASOPHILS # BLD AUTO: 0.06 K/UL — SIGNIFICANT CHANGE UP (ref 0–0.2)
BASOPHILS # BLD AUTO: 0.06 K/UL — SIGNIFICANT CHANGE UP (ref 0–0.2)
BASOPHILS NFR BLD AUTO: 1 % — SIGNIFICANT CHANGE UP (ref 0–2)
BASOPHILS NFR BLD AUTO: 1 % — SIGNIFICANT CHANGE UP (ref 0–2)
BUN SERPL-MCNC: 26.5 MG/DL — HIGH (ref 8–20)
BUN SERPL-MCNC: 26.5 MG/DL — HIGH (ref 8–20)
CALCIUM SERPL-MCNC: 8.8 MG/DL — SIGNIFICANT CHANGE UP (ref 8.4–10.5)
CALCIUM SERPL-MCNC: 8.8 MG/DL — SIGNIFICANT CHANGE UP (ref 8.4–10.5)
CHLORIDE SERPL-SCNC: 101 MMOL/L — SIGNIFICANT CHANGE UP (ref 96–108)
CHLORIDE SERPL-SCNC: 101 MMOL/L — SIGNIFICANT CHANGE UP (ref 96–108)
CO2 SERPL-SCNC: 25 MMOL/L — SIGNIFICANT CHANGE UP (ref 22–29)
CO2 SERPL-SCNC: 25 MMOL/L — SIGNIFICANT CHANGE UP (ref 22–29)
CREAT SERPL-MCNC: 1.37 MG/DL — HIGH (ref 0.5–1.3)
CREAT SERPL-MCNC: 1.37 MG/DL — HIGH (ref 0.5–1.3)
EGFR: 53 ML/MIN/1.73M2 — LOW
EGFR: 53 ML/MIN/1.73M2 — LOW
EOSINOPHIL # BLD AUTO: 0.39 K/UL — SIGNIFICANT CHANGE UP (ref 0–0.5)
EOSINOPHIL # BLD AUTO: 0.39 K/UL — SIGNIFICANT CHANGE UP (ref 0–0.5)
EOSINOPHIL NFR BLD AUTO: 6.7 % — HIGH (ref 0–6)
EOSINOPHIL NFR BLD AUTO: 6.7 % — HIGH (ref 0–6)
GLUCOSE SERPL-MCNC: 140 MG/DL — HIGH (ref 70–99)
GLUCOSE SERPL-MCNC: 140 MG/DL — HIGH (ref 70–99)
HCT VFR BLD CALC: 38 % — LOW (ref 39–50)
HCT VFR BLD CALC: 38 % — LOW (ref 39–50)
HCV AB S/CO SERPL IA: 0.07 S/CO — SIGNIFICANT CHANGE UP (ref 0–0.99)
HCV AB S/CO SERPL IA: 0.07 S/CO — SIGNIFICANT CHANGE UP (ref 0–0.99)
HCV AB SERPL-IMP: SIGNIFICANT CHANGE UP
HCV AB SERPL-IMP: SIGNIFICANT CHANGE UP
HGB BLD-MCNC: 12.8 G/DL — LOW (ref 13–17)
HGB BLD-MCNC: 12.8 G/DL — LOW (ref 13–17)
IMM GRANULOCYTES NFR BLD AUTO: 0.5 % — SIGNIFICANT CHANGE UP (ref 0–0.9)
IMM GRANULOCYTES NFR BLD AUTO: 0.5 % — SIGNIFICANT CHANGE UP (ref 0–0.9)
LYMPHOCYTES # BLD AUTO: 1.76 K/UL — SIGNIFICANT CHANGE UP (ref 1–3.3)
LYMPHOCYTES # BLD AUTO: 1.76 K/UL — SIGNIFICANT CHANGE UP (ref 1–3.3)
LYMPHOCYTES # BLD AUTO: 30 % — SIGNIFICANT CHANGE UP (ref 13–44)
LYMPHOCYTES # BLD AUTO: 30 % — SIGNIFICANT CHANGE UP (ref 13–44)
MCHC RBC-ENTMCNC: 31.4 PG — SIGNIFICANT CHANGE UP (ref 27–34)
MCHC RBC-ENTMCNC: 31.4 PG — SIGNIFICANT CHANGE UP (ref 27–34)
MCHC RBC-ENTMCNC: 33.7 GM/DL — SIGNIFICANT CHANGE UP (ref 32–36)
MCHC RBC-ENTMCNC: 33.7 GM/DL — SIGNIFICANT CHANGE UP (ref 32–36)
MCV RBC AUTO: 93.1 FL — SIGNIFICANT CHANGE UP (ref 80–100)
MCV RBC AUTO: 93.1 FL — SIGNIFICANT CHANGE UP (ref 80–100)
MONOCYTES # BLD AUTO: 0.71 K/UL — SIGNIFICANT CHANGE UP (ref 0–0.9)
MONOCYTES # BLD AUTO: 0.71 K/UL — SIGNIFICANT CHANGE UP (ref 0–0.9)
MONOCYTES NFR BLD AUTO: 12.1 % — SIGNIFICANT CHANGE UP (ref 2–14)
MONOCYTES NFR BLD AUTO: 12.1 % — SIGNIFICANT CHANGE UP (ref 2–14)
NEUTROPHILS # BLD AUTO: 2.91 K/UL — SIGNIFICANT CHANGE UP (ref 1.8–7.4)
NEUTROPHILS # BLD AUTO: 2.91 K/UL — SIGNIFICANT CHANGE UP (ref 1.8–7.4)
NEUTROPHILS NFR BLD AUTO: 49.7 % — SIGNIFICANT CHANGE UP (ref 43–77)
NEUTROPHILS NFR BLD AUTO: 49.7 % — SIGNIFICANT CHANGE UP (ref 43–77)
PLATELET # BLD AUTO: 165 K/UL — SIGNIFICANT CHANGE UP (ref 150–400)
PLATELET # BLD AUTO: 165 K/UL — SIGNIFICANT CHANGE UP (ref 150–400)
POTASSIUM SERPL-MCNC: 3.6 MMOL/L — SIGNIFICANT CHANGE UP (ref 3.5–5.3)
POTASSIUM SERPL-MCNC: 3.6 MMOL/L — SIGNIFICANT CHANGE UP (ref 3.5–5.3)
POTASSIUM SERPL-SCNC: 3.6 MMOL/L — SIGNIFICANT CHANGE UP (ref 3.5–5.3)
POTASSIUM SERPL-SCNC: 3.6 MMOL/L — SIGNIFICANT CHANGE UP (ref 3.5–5.3)
RBC # BLD: 4.08 M/UL — LOW (ref 4.2–5.8)
RBC # BLD: 4.08 M/UL — LOW (ref 4.2–5.8)
RBC # FLD: 13.7 % — SIGNIFICANT CHANGE UP (ref 10.3–14.5)
RBC # FLD: 13.7 % — SIGNIFICANT CHANGE UP (ref 10.3–14.5)
SODIUM SERPL-SCNC: 137 MMOL/L — SIGNIFICANT CHANGE UP (ref 135–145)
SODIUM SERPL-SCNC: 137 MMOL/L — SIGNIFICANT CHANGE UP (ref 135–145)
WBC # BLD: 5.86 K/UL — SIGNIFICANT CHANGE UP (ref 3.8–10.5)
WBC # BLD: 5.86 K/UL — SIGNIFICANT CHANGE UP (ref 3.8–10.5)
WBC # FLD AUTO: 5.86 K/UL — SIGNIFICANT CHANGE UP (ref 3.8–10.5)
WBC # FLD AUTO: 5.86 K/UL — SIGNIFICANT CHANGE UP (ref 3.8–10.5)

## 2023-11-25 PROCEDURE — 99223 1ST HOSP IP/OBS HIGH 75: CPT | Mod: GC

## 2023-11-25 PROCEDURE — 99223 1ST HOSP IP/OBS HIGH 75: CPT

## 2023-11-25 PROCEDURE — 99233 SBSQ HOSP IP/OBS HIGH 50: CPT

## 2023-11-25 RX ADMIN — HEPARIN SODIUM 1200 UNIT(S)/HR: 5000 INJECTION INTRAVENOUS; SUBCUTANEOUS at 20:23

## 2023-11-25 RX ADMIN — HEPARIN SODIUM 1200 UNIT(S)/HR: 5000 INJECTION INTRAVENOUS; SUBCUTANEOUS at 12:56

## 2023-11-25 RX ADMIN — HEPARIN SODIUM 1200 UNIT(S)/HR: 5000 INJECTION INTRAVENOUS; SUBCUTANEOUS at 20:48

## 2023-11-25 RX ADMIN — VALSARTAN 320 MILLIGRAM(S): 80 TABLET ORAL at 05:28

## 2023-11-25 RX ADMIN — HEPARIN SODIUM 0 UNIT(S)/HR: 5000 INJECTION INTRAVENOUS; SUBCUTANEOUS at 11:52

## 2023-11-25 RX ADMIN — Medication 150 MICROGRAM(S): at 05:28

## 2023-11-25 RX ADMIN — ATORVASTATIN CALCIUM 20 MILLIGRAM(S): 80 TABLET, FILM COATED ORAL at 21:07

## 2023-11-25 RX ADMIN — Medication 100 MILLIGRAM(S): at 21:07

## 2023-11-25 RX ADMIN — Medication 81 MILLIGRAM(S): at 14:59

## 2023-11-25 RX ADMIN — HEPARIN SODIUM 0 UNIT(S)/HR: 5000 INJECTION INTRAVENOUS; SUBCUTANEOUS at 02:47

## 2023-11-25 RX ADMIN — HEPARIN SODIUM 1500 UNIT(S)/HR: 5000 INJECTION INTRAVENOUS; SUBCUTANEOUS at 07:23

## 2023-11-25 RX ADMIN — HEPARIN SODIUM 1500 UNIT(S)/HR: 5000 INJECTION INTRAVENOUS; SUBCUTANEOUS at 03:50

## 2023-11-25 NOTE — PROGRESS NOTE ADULT - ASSESSMENT
79 yo female with pmhx HTN, HLD, DVT on Eliquis since 2017, Prothrombin gene mutation, hypothyroidism presenting to the ED with complaint of left knee pain for 1 day.    Acute DVT; Prothrombin gene mutation  -Admit to medicine  -Patient has been compliant with Eliquis without any missed doses, however, he has been taking the 2.5 dose and does not seem to meet criteria for reduced dose so perhaps has been on a subtherapeutic dose causing recurrence of DVT  -Heparin gtt for now and hold Eliquis  -Vascular surgery following  -Consult heme for recommendations    HTN, HLD  -Continue Valsartan 320, HCTZ 12.5, Labetalol 100 mg BID (14:00 and 21:00)   -Continue statin- Atorvastatin as formulary sub for Pitavastatin  -Continue Aspirin 81 mg     Hypothyroidism  -Continue Synthroid 150 mcg daily    DVTppx: Heparin gtt  GOC: Full Code 77 yo female with pmhx HTN, HLD, DVT on Eliquis since 2017, Prothrombin gene mutation, hypothyroidism presenting to the ED with complaint of left knee pain for 1 day.    Acute DVT; Prothrombin gene mutation  -Patient has been compliant with Eliquis without any missed doses, however, he has been taking the 2.5 dose and does not seem to meet criteria for reduced dose.   -Heparin gtt for now and hold Eliquis  -Vascular surgery following  -Heme consulted, recs noted, will f/u genetic testing    HTN, HLD  -Continue Valsartan 320, HCTZ 12.5, Labetalol 100 mg BID (14:00 and 21:00)   -Continue statin- Atorvastatin as formulary sub for Pitavastatin  -Continue Aspirin 81 mg     Hypothyroidism  -Continue Synthroid 150 mcg daily    DVTppx: Heparin gtt  GOC: Full Code

## 2023-11-25 NOTE — PROGRESS NOTE ADULT - SUBJECTIVE AND OBJECTIVE BOX
INTERVAL HPI/OVERNIGHT EVENTS: No events overnight.     MEDICATIONS  (STANDING):  aspirin enteric coated 81 milliGRAM(s) Oral daily  atorvastatin 20 milliGRAM(s) Oral at bedtime  heparin  Infusion.  Unit(s)/Hr (18 mL/Hr) IV Continuous <Continuous>  hydrochlorothiazide 12.5 milliGRAM(s) Oral daily  labetalol 100 milliGRAM(s) Oral <User Schedule>  levothyroxine 150 MICROGram(s) Oral daily  valsartan 320 milliGRAM(s) Oral daily    MEDICATIONS  (PRN):  acetaminophen     Tablet .. 650 milliGRAM(s) Oral every 6 hours PRN Temp greater or equal to 38C (100.4F), Mild Pain (1 - 3)  aluminum hydroxide/magnesium hydroxide/simethicone Suspension 30 milliLiter(s) Oral every 4 hours PRN Dyspepsia  heparin   Injectable 8000 Unit(s) IV Push every 6 hours PRN For aPTT less than 40  heparin   Injectable 4000 Unit(s) IV Push every 6 hours PRN For aPTT between 40 - 57  melatonin 3 milliGRAM(s) Oral at bedtime PRN Insomnia  ondansetron Injectable 4 milliGRAM(s) IV Push every 8 hours PRN Nausea and/or Vomiting      Vital Signs Last 24 Hrs  T(C): 36.7 (25 Nov 2023 11:22), Max: 36.7 (24 Nov 2023 14:29)  T(F): 98 (25 Nov 2023 11:22), Max: 98 (24 Nov 2023 14:29)  HR: 57 (25 Nov 2023 11:22) (52 - 61)  BP: 138/65 (25 Nov 2023 11:22) (118/60 - 156/73)  BP(mean): --  RR: 18 (25 Nov 2023 11:22) (18 - 20)  SpO2: 95% (25 Nov 2023 11:22) (95% - 98%)    Parameters below as of 25 Nov 2023 11:22  Patient On (Oxygen Delivery Method): room air        Physical Exam:    Neurological:  No sensory/motor deficits    HEENT: PERRLA, EOMI, no drainage or redness    Neck: No bruits; no thyromegaly or nodules,  No JVD    Back: Normal spine flexure, No CVA tenderness, No deformity or limitation of movement    Respiratory: Breath Sounds equal & clear to auscultation, no accessory muscle use    Cardiovascular: Regular rate & rhythm, normal S1, S2; no murmurs, gallops or rubs    Gastrointestinal: Soft, non-tender, normal bowel sounds    Extremities: Left leg edema and mild left calf tenderness    Vascular: Equal and normal pulses: 2+ peripheral pulses throughout    Musculoskeletal: No joint pain, swelling or deformity; no limitation of movement    Skin: No rashes      I&O's Detail      LABS:                        12.8   5.86  )-----------( 165      ( 25 Nov 2023 02:14 )             38.0     11-25    137  |  101  |  26.5<H>  ----------------------------<  140<H>  3.6   |  25.0  |  1.37<H>    Ca    8.8      25 Nov 2023 02:14    TPro  7.4  /  Alb  4.6  /  TBili  0.3<L>  /  DBili  x   /  AST  27  /  ALT  25  /  AlkPhos  58  11-24    PT/INR - ( 24 Nov 2023 17:15 )   PT: 11.3 sec;   INR: 1.02 ratio         PTT - ( 25 Nov 2023 10:32 )  PTT:140.1 sec  Urinalysis Basic - ( 25 Nov 2023 02:14 )    Color: x / Appearance: x / SG: x / pH: x  Gluc: 140 mg/dL / Ketone: x  / Bili: x / Urobili: x   Blood: x / Protein: x / Nitrite: x   Leuk Esterase: x / RBC: x / WBC x   Sq Epi: x / Non Sq Epi: x / Bacteria: x

## 2023-11-25 NOTE — CONSULT NOTE ADULT - SUBJECTIVE AND OBJECTIVE BOX
HPI:  79 yo female with pmhx HTN, HLD, DVT on Eliquis since 2017, Prothrombin gene mutation, hypothyroidism presenting to the ED with complaint of left knee pain for 1 day. He did not have any trauma to the area. He noted that it felt similar to his previous DVT in 2017 so he presented to the hospital. He was initially given Eliquis after his first DVT/PE, was taking 5 mg BID. However, for the past few years he has been taking 2.5 mg BID. He states the dose was decreased by his cardiologist, but he is not sure why. He denies any SOB, palpitations, chest pain, or other associated symptoms.  (24 Nov 2023 21:07)    LLE Doppler showed acute DVT in left superficial femoral vein. Patient was started on iv heparin    PAST MEDICAL & SURGICAL HISTORY:  Hypothyroid  HTN (hypertension)  DVT of lower limb, acute  HLD (hyperlipidemia)  No significant past surgical history    ROS : as above    FAMILY HISTORY:  Family history of clotting disorder (Child) Son prothrombin gene mutation and massive PE  Brother DVT after surgery in his 60's   FH: heart disease (Mother)      Social History:  Denies smoking, alcohol, drug use (24 Nov 2023 21:07)      Vital Signs Last 24 Hrs  T(C): 36.7 (25 Nov 2023 11:22), Max: 36.7 (24 Nov 2023 14:29)  T(F): 98 (25 Nov 2023 11:22), Max: 98 (24 Nov 2023 14:29)  HR: 57 (25 Nov 2023 11:22) (52 - 61)  BP: 138/65 (25 Nov 2023 11:22) (118/60 - 156/73)  BP(mean): --  RR: 18 (25 Nov 2023 11:22) (18 - 20)  SpO2: 95% (25 Nov 2023 11:22) (95% - 98%)  Parameters below as of 25 Nov 2023 11:22  Patient On (Oxygen Delivery Method): room air                            12.8   5.86  )-----------( 165      ( 25 Nov 2023 02:14 )             38.0     11-25    137  |  101  |  26.5<H>  ----------------------------<  140<H>  3.6   |  25.0  |  1.37<H>    Ca    8.8      25 Nov 2023 02:14    TPro  7.4  /  Alb  4.6  /  TBili  0.3<L>  /  DBili  x   /  AST  27  /  ALT  25  /  AlkPhos  58  11-24    ACC: [id number] EXAM:  US DPLX LWR EXT VEINS LTD LT   ORDERED BY: KARINA ESQUIVEL     PROCEDURE DATE:  11/24/2023    INTERPRETATION:  CLINICAL INFORMATION: Calf pain, rule out DVT  COMPARISON: None available.    FINDINGS:  Normal compressibility of the left common femoral vein, saphenofemoral   junction, and proximal deep femoral vein. Abnormal lack of   compressibility and lack of flow of the left superficial femoral vein.   Partial compressibility and flow of the left popliteal vein with   eccentric echogenic material. Left calf veins are patent. Contralateral   right common femoral vein is patent with phasic flow. Findings are   confirmed on Doppler.    IMPRESSION:  Acute left lower extremity DVT above the knee involving the left femoral   vein.  Chronic venous changes of the left popliteal vein.      MEDICATIONS  (STANDING):  aspirin enteric coated 81 milliGRAM(s) Oral daily  atorvastatin 20 milliGRAM(s) Oral at bedtime  heparin  Infusion.  Unit(s)/Hr (18 mL/Hr) IV Continuous <Continuous>  hydrochlorothiazide 12.5 milliGRAM(s) Oral daily  labetalol 100 milliGRAM(s) Oral <User Schedule>  levothyroxine 150 MICROGram(s) Oral daily  valsartan 320 milliGRAM(s) Oral daily    MEDICATIONS  (PRN):  acetaminophen     Tablet .. 650 milliGRAM(s) Oral every 6 hours PRN Temp greater or equal to 38C (100.4F), Mild Pain (1 - 3)  aluminum hydroxide/magnesium hydroxide/simethicone Suspension 30 milliLiter(s) Oral every 4 hours PRN Dyspepsia  heparin   Injectable 8000 Unit(s) IV Push every 6 hours PRN For aPTT less than 40  heparin   Injectable 4000 Unit(s) IV Push every 6 hours PRN For aPTT between 40 - 57  melatonin 3 milliGRAM(s) Oral at bedtime PRN Insomnia  ondansetron Injectable 4 milliGRAM(s) IV Push every 8 hours PRN Nausea and/or Vomiting   HPI:  77 yo female with pmhx HTN, HLD, DVT on Eliquis since 2017, Prothrombin gene mutation, hypothyroidism presenting to the ED with complaint of left knee pain for 1 day. He did not have any trauma to the area. He noted that it felt similar to his previous DVT in 2017 so he presented to the hospital. He was initially given Eliquis after his first DVT/PE, was taking 5 mg BID. However, for the past few years he has been taking 2.5 mg BID. He states the dose was decreased by his cardiologist, but he is not sure why. He denies any SOB, palpitations, chest pain, or other associated symptoms.  (24 Nov 2023 21:07)    LLE Doppler showed acute DVT in left superficial femoral vein. Patient was started on iv heparin    PAST MEDICAL & SURGICAL HISTORY:  Hypothyroid  HTN (hypertension)  DVT of lower limb, acute  HLD (hyperlipidemia)  No significant past surgical history    ROS : as above    FAMILY HISTORY:  Family history of clotting disorder (Child) Son prothrombin gene mutation and massive PE  Brother DVT after surgery in his 60's   FH: heart disease (Mother)      Social History:  Denies smoking, alcohol, drug use (24 Nov 2023 21:07)      Vital Signs Last 24 Hrs  T(C): 36.7 (25 Nov 2023 11:22), Max: 36.7 (24 Nov 2023 14:29)  T(F): 98 (25 Nov 2023 11:22), Max: 98 (24 Nov 2023 14:29)  HR: 57 (25 Nov 2023 11:22) (52 - 61)  BP: 138/65 (25 Nov 2023 11:22) (118/60 - 156/73)  BP(mean): --  RR: 18 (25 Nov 2023 11:22) (18 - 20)  SpO2: 95% (25 Nov 2023 11:22) (95% - 98%)  Parameters below as of 25 Nov 2023 11:22  Patient On (Oxygen Delivery Method): room air    Pleasant elderly gentleman looks well NAD  HEENT normal  No adenopathy.  Lungs clear.  Heart RRR  Abd soft NT  Extr -  well perfused, no overt leg edema   Neuro non focal  Musculoskeletal normal                          12.8   5.86  )-----------( 165      ( 25 Nov 2023 02:14 )             38.0     11-25    137  |  101  |  26.5<H>  ----------------------------<  140<H>  3.6   |  25.0  |  1.37<H>    Ca    8.8      25 Nov 2023 02:14    TPro  7.4  /  Alb  4.6  /  TBili  0.3<L>  /  DBili  x   /  AST  27  /  ALT  25  /  AlkPhos  58  11-24    ACC: [id number] EXAM:  US DPLX LWR EXT VEINS LTD LT   ORDERED BY: KARINA ESQUIVEL     PROCEDURE DATE:  11/24/2023    INTERPRETATION:  CLINICAL INFORMATION: Calf pain, rule out DVT  COMPARISON: None available.    FINDINGS:  Normal compressibility of the left common femoral vein, saphenofemoral   junction, and proximal deep femoral vein. Abnormal lack of   compressibility and lack of flow of the left superficial femoral vein.   Partial compressibility and flow of the left popliteal vein with   eccentric echogenic material. Left calf veins are patent. Contralateral   right common femoral vein is patent with phasic flow. Findings are   confirmed on Doppler.    IMPRESSION:  Acute left lower extremity DVT above the knee involving the left femoral   vein.  Chronic venous changes of the left popliteal vein.      MEDICATIONS  (STANDING):  aspirin enteric coated 81 milliGRAM(s) Oral daily  atorvastatin 20 milliGRAM(s) Oral at bedtime  heparin  Infusion.  Unit(s)/Hr (18 mL/Hr) IV Continuous <Continuous>  hydrochlorothiazide 12.5 milliGRAM(s) Oral daily  labetalol 100 milliGRAM(s) Oral <User Schedule>  levothyroxine 150 MICROGram(s) Oral daily  valsartan 320 milliGRAM(s) Oral daily    MEDICATIONS  (PRN):  acetaminophen     Tablet .. 650 milliGRAM(s) Oral every 6 hours PRN Temp greater or equal to 38C (100.4F), Mild Pain (1 - 3)  aluminum hydroxide/magnesium hydroxide/simethicone Suspension 30 milliLiter(s) Oral every 4 hours PRN Dyspepsia  heparin   Injectable 8000 Unit(s) IV Push every 6 hours PRN For aPTT less than 40  heparin   Injectable 4000 Unit(s) IV Push every 6 hours PRN For aPTT between 40 - 57  melatonin 3 milliGRAM(s) Oral at bedtime PRN Insomnia  ondansetron Injectable 4 milliGRAM(s) IV Push every 8 hours PRN Nausea and/or Vomiting

## 2023-11-25 NOTE — PROGRESS NOTE ADULT - SUBJECTIVE AND OBJECTIVE BOX
[facility] Division of Hospital Medicine    Chief Complaint:      SUBJECTIVE / OVERNIGHT EVENTS:    Patient denies chest pain, SOB, abd pain, N/V, fever, chills, dysuria or any other complaints. All remainder ROS negative.     MEDICATIONS  (STANDING):  aspirin enteric coated 81 milliGRAM(s) Oral daily  atorvastatin 20 milliGRAM(s) Oral at bedtime  heparin  Infusion.  Unit(s)/Hr (18 mL/Hr) IV Continuous <Continuous>  hydrochlorothiazide 12.5 milliGRAM(s) Oral daily  labetalol 100 milliGRAM(s) Oral <User Schedule>  levothyroxine 150 MICROGram(s) Oral daily  valsartan 320 milliGRAM(s) Oral daily    MEDICATIONS  (PRN):  acetaminophen     Tablet .. 650 milliGRAM(s) Oral every 6 hours PRN Temp greater or equal to 38C (100.4F), Mild Pain (1 - 3)  aluminum hydroxide/magnesium hydroxide/simethicone Suspension 30 milliLiter(s) Oral every 4 hours PRN Dyspepsia  heparin   Injectable 8000 Unit(s) IV Push every 6 hours PRN For aPTT less than 40  heparin   Injectable 4000 Unit(s) IV Push every 6 hours PRN For aPTT between 40 - 57  melatonin 3 milliGRAM(s) Oral at bedtime PRN Insomnia  ondansetron Injectable 4 milliGRAM(s) IV Push every 8 hours PRN Nausea and/or Vomiting        I&O's Summary      PHYSICAL EXAM:  Vital Signs Last 24 Hrs  T(C): 36.7 (25 Nov 2023 11:22), Max: 36.7 (24 Nov 2023 14:29)  T(F): 98 (25 Nov 2023 11:22), Max: 98 (24 Nov 2023 14:29)  HR: 57 (25 Nov 2023 11:22) (52 - 61)  BP: 138/65 (25 Nov 2023 11:22) (118/60 - 156/73)  BP(mean): --  RR: 18 (25 Nov 2023 11:22) (18 - 20)  SpO2: 95% (25 Nov 2023 11:22) (95% - 98%)    Parameters below as of 25 Nov 2023 11:22  Patient On (Oxygen Delivery Method): room air            CONSTITUTIONAL: NAD, well-developed, well-groomed  ENMT: Moist oral mucosa, no pharyngeal injection or exudates; normal dentition  RESPIRATORY: Normal respiratory effort; lungs are clear to auscultation bilaterally  CARDIOVASCULAR: Regular rate and rhythm, normal S1 and S2, no murmur/rub/gallop; No lower extremity edema; Peripheral pulses are 2+ bilaterally  ABDOMEN: Nontender to palpation, normoactive bowel sounds, no rebound/guarding; No hepatosplenomegaly  MUSCLOSKELETAL:  Normal gait; no clubbing or cyanosis of digits; no joint swelling or tenderness to palpation  PSYCH: A+O to person, place, and time; affect appropriate  NEUROLOGY: CN 2-12 are intact and symmetric; no gross sensory deficits;   SKIN: No rashes; no palpable lesions    LABS:                        12.8   5.86  )-----------( 165      ( 25 Nov 2023 02:14 )             38.0     11-25    137  |  101  |  26.5<H>  ----------------------------<  140<H>  3.6   |  25.0  |  1.37<H>    Ca    8.8      25 Nov 2023 02:14    TPro  7.4  /  Alb  4.6  /  TBili  0.3<L>  /  DBili  x   /  AST  27  /  ALT  25  /  AlkPhos  58  11-24    PT/INR - ( 24 Nov 2023 17:15 )   PT: 11.3 sec;   INR: 1.02 ratio         PTT - ( 25 Nov 2023 10:32 )  PTT:140.1 sec      Urinalysis Basic - ( 25 Nov 2023 02:14 )    Color: x / Appearance: x / SG: x / pH: x  Gluc: 140 mg/dL / Ketone: x  / Bili: x / Urobili: x   Blood: x / Protein: x / Nitrite: x   Leuk Esterase: x / RBC: x / WBC x   Sq Epi: x / Non Sq Epi: x / Bacteria: x        CAPILLARY BLOOD GLUCOSE            RADIOLOGY & ADDITIONAL TESTS:  Results Reviewed:   Imaging Personally Reviewed:  Electrocardiogram Personally Reviewed:                                           [facility] Division of Hospital Medicine    SUBJECTIVE / OVERNIGHT EVENTS:    Patient says leg pain has resolved. Patient denies chest pain, SOB, abd pain, N/V, fever, chills, dysuria or any other complaints. All remainder ROS negative.     MEDICATIONS  (STANDING):  aspirin enteric coated 81 milliGRAM(s) Oral daily  atorvastatin 20 milliGRAM(s) Oral at bedtime  heparin  Infusion.  Unit(s)/Hr (18 mL/Hr) IV Continuous <Continuous>  hydrochlorothiazide 12.5 milliGRAM(s) Oral daily  labetalol 100 milliGRAM(s) Oral <User Schedule>  levothyroxine 150 MICROGram(s) Oral daily  valsartan 320 milliGRAM(s) Oral daily    MEDICATIONS  (PRN):  acetaminophen     Tablet .. 650 milliGRAM(s) Oral every 6 hours PRN Temp greater or equal to 38C (100.4F), Mild Pain (1 - 3)  aluminum hydroxide/magnesium hydroxide/simethicone Suspension 30 milliLiter(s) Oral every 4 hours PRN Dyspepsia  heparin   Injectable 8000 Unit(s) IV Push every 6 hours PRN For aPTT less than 40  heparin   Injectable 4000 Unit(s) IV Push every 6 hours PRN For aPTT between 40 - 57  melatonin 3 milliGRAM(s) Oral at bedtime PRN Insomnia  ondansetron Injectable 4 milliGRAM(s) IV Push every 8 hours PRN Nausea and/or Vomiting        I&O's Summary      PHYSICAL EXAM:  Vital Signs Last 24 Hrs  T(C): 36.7 (25 Nov 2023 11:22), Max: 36.7 (24 Nov 2023 14:29)  T(F): 98 (25 Nov 2023 11:22), Max: 98 (24 Nov 2023 14:29)  HR: 57 (25 Nov 2023 11:22) (52 - 61)  BP: 138/65 (25 Nov 2023 11:22) (118/60 - 156/73)  BP(mean): --  RR: 18 (25 Nov 2023 11:22) (18 - 20)  SpO2: 95% (25 Nov 2023 11:22) (95% - 98%)    Parameters below as of 25 Nov 2023 11:22  Patient On (Oxygen Delivery Method): room air            General: Age-appearing, in no acute distress  Head: Normocephalic, atraumatic  ENMT: EOMI, no scleral icterus, neck supple, no JVD  Cardiovascular: +S1, S2; Regular rate and rhythm, no murmurs, rubs, gallops  Respiratory: CTAB, no wheezing, no rales, no rhonchi  Gastrointestinal: soft, ND, NT, (+) BS, (-) rebound, (-) fluid wave  Extremities: No clubbing, cyanosis  Vascular: 2+ pulses, cap refill < 2 seconds  Neuro: AAOx3, CN2-12 intact, no FND  Musculoskeletal: Normal tone, no deformities  Skin: Warm, dry, no rash, no jaundice  Psych: Calm, cooperative     LABS:                        12.8   5.86  )-----------( 165      ( 25 Nov 2023 02:14 )             38.0     11-25    137  |  101  |  26.5<H>  ----------------------------<  140<H>  3.6   |  25.0  |  1.37<H>    Ca    8.8      25 Nov 2023 02:14    TPro  7.4  /  Alb  4.6  /  TBili  0.3<L>  /  DBili  x   /  AST  27  /  ALT  25  /  AlkPhos  58  11-24    PT/INR - ( 24 Nov 2023 17:15 )   PT: 11.3 sec;   INR: 1.02 ratio         PTT - ( 25 Nov 2023 10:32 )  PTT:140.1 sec      Urinalysis Basic - ( 25 Nov 2023 02:14 )    Color: x / Appearance: x / SG: x / pH: x  Gluc: 140 mg/dL / Ketone: x  / Bili: x / Urobili: x   Blood: x / Protein: x / Nitrite: x   Leuk Esterase: x / RBC: x / WBC x   Sq Epi: x / Non Sq Epi: x / Bacteria: x        CAPILLARY BLOOD GLUCOSE            RADIOLOGY & ADDITIONAL TESTS:  Results Reviewed:   Imaging Personally Reviewed:  Electrocardiogram Personally Reviewed:

## 2023-11-25 NOTE — CONSULT NOTE ADULT - ASSESSMENT
79 y/o male with   heterozygous prothrombin gene mutation and history of  unprovoked ( probably) LLE DVT (left common femoral to popliteal )  and right segmental  PE in 2017.   Unclear if had thrombophilia w/up at that time. CT CAP were negative for malignancy. He took Eliquis 5 mg bid for few years, next reduced to 2.5 mg bid at least since 2021. No bleeding complications.    Reviewed available labs in [id number]€[employer] system :  Positive for heterozygous prothrombin gene mutation ( 2021) No comprehensive thrombophilia w/up.    Now he presents with unprovoked acute LLE DVT despite prophylactic dose of Eliquis 2.5 mg bid.  Clinical thrombophilia.  Needs indefinite anticoagulation.  Will need full thrombophilia w/up. For now will sent LAC and APS - if triple positive - will need LMWH followed by  warfarin.  If negative - probably can be treated  with full dose DOAC   Outpatient hematology follow up with Dr Liu  to complete w/up and future f/u.  77 y/o male with   heterozygous prothrombin gene mutation and history of  unprovoked  LLE DVT (left common femoral to popliteal )  and right segmental  PE in 2017.   Unclear if had thrombophilia w/up at that time. CT CAP were negative for malignancy. He took Eliquis 5 mg bid for few years, next reduced to 2.5 mg bid at least since 2021. No bleeding complications.    Reviewed available labs in [employer] system :  Positive for heterozygous prothrombin gene mutation ( 2021) No comprehensive thrombophilia w/up.    Now he presents with unprovoked acute LLE DVT despite prophylactic dose of Eliquis 2.5 mg bid.  Clinical thrombophilia.  Needs indefinite anticoagulation.  Will need full thrombophilia w/up. For now will sent LAC and APS  ( ordered) . If triple positive for LAC and cardiolipin and glycoprotein antibodies - will need LMWH followed by  warfarin.   If LAC and APS results still pending at discharge- will need  Lovenox 90 mg q 12 hrs  for now and follow up with hematology  ( Dr Liu) in one -two weeks to decide if OK to  change to Eliquis   If LAC/ ASP negative - probably can be treated  with full dose DOAC - Eliquis 10 mg bid x 7 days next 5 mg bid indefinitely.   Outpatient hematology follow up with Dr Liu  to complete w/up and future f/u.

## 2023-11-25 NOTE — PROGRESS NOTE ADULT - ASSESSMENT
77yo M w/ hx of prothrombin gene disorder presenting with L femoral DVT.     PLAN:  - No acute vascular surgery intervention at this time  -F/U  heme onc recommendations regarding anticoagulation  - continue hep gtt    Pt evaluated with attending on rounds

## 2023-11-26 ENCOUNTER — TRANSCRIPTION ENCOUNTER (OUTPATIENT)
Age: 78
End: 2023-11-26

## 2023-11-26 LAB
ANION GAP SERPL CALC-SCNC: 13 MMOL/L — SIGNIFICANT CHANGE UP (ref 5–17)
ANION GAP SERPL CALC-SCNC: 13 MMOL/L — SIGNIFICANT CHANGE UP (ref 5–17)
APTT BLD: 76.1 SEC — HIGH (ref 24.5–35.6)
APTT BLD: 76.1 SEC — HIGH (ref 24.5–35.6)
BUN SERPL-MCNC: 20.4 MG/DL — HIGH (ref 8–20)
BUN SERPL-MCNC: 20.4 MG/DL — HIGH (ref 8–20)
CALCIUM SERPL-MCNC: 9.4 MG/DL — SIGNIFICANT CHANGE UP (ref 8.4–10.5)
CALCIUM SERPL-MCNC: 9.4 MG/DL — SIGNIFICANT CHANGE UP (ref 8.4–10.5)
CHLORIDE SERPL-SCNC: 102 MMOL/L — SIGNIFICANT CHANGE UP (ref 96–108)
CHLORIDE SERPL-SCNC: 102 MMOL/L — SIGNIFICANT CHANGE UP (ref 96–108)
CO2 SERPL-SCNC: 24 MMOL/L — SIGNIFICANT CHANGE UP (ref 22–29)
CO2 SERPL-SCNC: 24 MMOL/L — SIGNIFICANT CHANGE UP (ref 22–29)
CREAT SERPL-MCNC: 1.3 MG/DL — SIGNIFICANT CHANGE UP (ref 0.5–1.3)
CREAT SERPL-MCNC: 1.3 MG/DL — SIGNIFICANT CHANGE UP (ref 0.5–1.3)
EGFR: 56 ML/MIN/1.73M2 — LOW
EGFR: 56 ML/MIN/1.73M2 — LOW
GLUCOSE SERPL-MCNC: 122 MG/DL — HIGH (ref 70–99)
GLUCOSE SERPL-MCNC: 122 MG/DL — HIGH (ref 70–99)
HCT VFR BLD CALC: 40.9 % — SIGNIFICANT CHANGE UP (ref 39–50)
HCT VFR BLD CALC: 40.9 % — SIGNIFICANT CHANGE UP (ref 39–50)
HGB BLD-MCNC: 13.6 G/DL — SIGNIFICANT CHANGE UP (ref 13–17)
HGB BLD-MCNC: 13.6 G/DL — SIGNIFICANT CHANGE UP (ref 13–17)
MCHC RBC-ENTMCNC: 31.1 PG — SIGNIFICANT CHANGE UP (ref 27–34)
MCHC RBC-ENTMCNC: 31.1 PG — SIGNIFICANT CHANGE UP (ref 27–34)
MCHC RBC-ENTMCNC: 33.3 GM/DL — SIGNIFICANT CHANGE UP (ref 32–36)
MCHC RBC-ENTMCNC: 33.3 GM/DL — SIGNIFICANT CHANGE UP (ref 32–36)
MCV RBC AUTO: 93.4 FL — SIGNIFICANT CHANGE UP (ref 80–100)
MCV RBC AUTO: 93.4 FL — SIGNIFICANT CHANGE UP (ref 80–100)
PLATELET # BLD AUTO: 194 K/UL — SIGNIFICANT CHANGE UP (ref 150–400)
PLATELET # BLD AUTO: 194 K/UL — SIGNIFICANT CHANGE UP (ref 150–400)
POTASSIUM SERPL-MCNC: 4 MMOL/L — SIGNIFICANT CHANGE UP (ref 3.5–5.3)
POTASSIUM SERPL-MCNC: 4 MMOL/L — SIGNIFICANT CHANGE UP (ref 3.5–5.3)
POTASSIUM SERPL-SCNC: 4 MMOL/L — SIGNIFICANT CHANGE UP (ref 3.5–5.3)
POTASSIUM SERPL-SCNC: 4 MMOL/L — SIGNIFICANT CHANGE UP (ref 3.5–5.3)
RBC # BLD: 4.38 M/UL — SIGNIFICANT CHANGE UP (ref 4.2–5.8)
RBC # BLD: 4.38 M/UL — SIGNIFICANT CHANGE UP (ref 4.2–5.8)
RBC # FLD: 13.9 % — SIGNIFICANT CHANGE UP (ref 10.3–14.5)
RBC # FLD: 13.9 % — SIGNIFICANT CHANGE UP (ref 10.3–14.5)
SODIUM SERPL-SCNC: 139 MMOL/L — SIGNIFICANT CHANGE UP (ref 135–145)
SODIUM SERPL-SCNC: 139 MMOL/L — SIGNIFICANT CHANGE UP (ref 135–145)
WBC # BLD: 6.47 K/UL — SIGNIFICANT CHANGE UP (ref 3.8–10.5)
WBC # BLD: 6.47 K/UL — SIGNIFICANT CHANGE UP (ref 3.8–10.5)
WBC # FLD AUTO: 6.47 K/UL — SIGNIFICANT CHANGE UP (ref 3.8–10.5)
WBC # FLD AUTO: 6.47 K/UL — SIGNIFICANT CHANGE UP (ref 3.8–10.5)

## 2023-11-26 PROCEDURE — 99231 SBSQ HOSP IP/OBS SF/LOW 25: CPT | Mod: GC

## 2023-11-26 PROCEDURE — 99233 SBSQ HOSP IP/OBS HIGH 50: CPT

## 2023-11-26 RX ORDER — POLYETHYLENE GLYCOL 3350 17 G/17G
17 POWDER, FOR SOLUTION ORAL DAILY
Refills: 0 | Status: DISCONTINUED | OUTPATIENT
Start: 2023-11-26 | End: 2023-11-28

## 2023-11-26 RX ADMIN — HEPARIN SODIUM 1200 UNIT(S)/HR: 5000 INJECTION INTRAVENOUS; SUBCUTANEOUS at 19:11

## 2023-11-26 RX ADMIN — Medication 150 MICROGRAM(S): at 06:58

## 2023-11-26 RX ADMIN — VALSARTAN 320 MILLIGRAM(S): 80 TABLET ORAL at 06:58

## 2023-11-26 RX ADMIN — HEPARIN SODIUM 1200 UNIT(S)/HR: 5000 INJECTION INTRAVENOUS; SUBCUTANEOUS at 08:05

## 2023-11-26 RX ADMIN — ATORVASTATIN CALCIUM 20 MILLIGRAM(S): 80 TABLET, FILM COATED ORAL at 22:13

## 2023-11-26 RX ADMIN — Medication 100 MILLIGRAM(S): at 14:08

## 2023-11-26 RX ADMIN — HEPARIN SODIUM 1200 UNIT(S)/HR: 5000 INJECTION INTRAVENOUS; SUBCUTANEOUS at 02:47

## 2023-11-26 RX ADMIN — POLYETHYLENE GLYCOL 3350 17 GRAM(S): 17 POWDER, FOR SOLUTION ORAL at 15:11

## 2023-11-26 RX ADMIN — Medication 81 MILLIGRAM(S): at 11:34

## 2023-11-26 NOTE — PROGRESS NOTE ADULT - ASSESSMENT
79 y/o male with PMH DVT was consulted for DVT. On exam patient is afebrile and hemodynamically stable with mild edema of LLE and palpable distal pulses and no tenderness on exam. Patient ambulating well with no pain. Recommend Eliquis 5 mg BID for DVT. Follow up Heme recommendations.     PLAN  Recommend Eliquis 5mg BID for DVT  Follow heme recs  Continue SCDs 79 y/o male with PMH DVT was consulted for DVT. On exam patient is afebrile and hemodynamically stable with mild edema of LLE and palpable distal pulses and no tenderness on exam. Patient ambulating well with no pain. Recommend Eliquis 5 mg BID for DVT. Follow up Heme recommendations.     PLAN  Recommend Eliquis 5mg BID for DVT  Follow heme recs  Vascular surgery will be signing off at this time. Please re-consult if you have any other questions or concerns.   Continue care per primary team  Continue SCDs

## 2023-11-26 NOTE — PROGRESS NOTE ADULT - ASSESSMENT
77 yo female with pmhx HTN, HLD, DVT on Eliquis since 2017, Prothrombin gene mutation, hypothyroidism presenting to the ED with complaint of left knee pain for 1 day.    Acute DVT; Prothrombin gene mutation  -Patient has been compliant with Eliquis without any missed doses, however, he has been taking the 2.5 dose and does not seem to meet criteria for reduced dose.   -Heparin gtt for now and hold Eliquis  -Vascular surgery following  -Heme consulted, recs noted, will f/u genetic testing    HTN, HLD  -Continue Valsartan 320, HCTZ 12.5, Labetalol 100 mg BID (14:00 and 21:00)   -Continue statin- Atorvastatin as formulary sub for Pitavastatin  -Continue Aspirin 81 mg     Hypothyroidism  -Continue Synthroid 150 mcg daily    DVTppx: Heparin gtt  GOC: Full Code 77 yo female with pmhx HTN, HLD, DVT on Eliquis since 2017, Prothrombin gene mutation, hypothyroidism presenting to the ED with complaint of left knee pain for 1 day.    Acute DVT; Prothrombin gene mutation  -Patient has been compliant with Eliquis without any missed doses, however, he has been taking the 2.5 dose and does not seem to meet criteria for reduced dose.   -Heparin gtt for now and hold Eliquis  -Vascular surgery consulted, recs noted.  -Heme consulted, recs noted, will f/u anticardiolipin ab and beta2 glycoprotein  -Pending anticardiolipin ab and beta2 glycoprotein will determine which anticoagulant the patient will need indefinitely (eliquis vs coumadin)    HTN, HLD  -Continue Valsartan 320, HCTZ 12.5, Labetalol 100 mg BID (14:00 and 21:00)   -Continue statin- Atorvastatin as formulary sub for Pitavastatin  -Continue Aspirin 81 mg     Hypothyroidism  -Continue Synthroid 150 mcg daily    Constipation  -Trial of Miralax    DVTppx: Heparin gtt  GOC: Full Code

## 2023-11-26 NOTE — PROGRESS NOTE ADULT - SUBJECTIVE AND OBJECTIVE BOX
Patient seen and examined at bedside.     No acute events overnight. Patient reports that he has no leg pain and has been ambulating well. Denies numbness, tingling, and swelling of LLE.     Vitals:  ICU Vital Signs Last 24 Hrs  T(C): 36.4 (26 Nov 2023 05:40), Max: 36.7 (25 Nov 2023 11:22)  T(F): 97.6 (26 Nov 2023 05:40), Max: 98.1 (25 Nov 2023 18:54)  HR: 54 (26 Nov 2023 05:40) (54 - 59)  BP: 146/66 (26 Nov 2023 05:40) (131/68 - 166/70)  BP(mean): 103 (25 Nov 2023 18:54) (103 - 103)  ABP: --  ABP(mean): --  RR: 18 (26 Nov 2023 05:40) (17 - 18)  SpO2: 96% (26 Nov 2023 05:40) (95% - 98%)    O2 Parameters below as of 26 Nov 2023 05:40  Patient On (Oxygen Delivery Method): room air    Labs:  11-26    139  |  102  |  20.4<H>  ----------------------------<  122<H>  4.0   |  24.0  |  1.30    Ca    9.4      26 Nov 2023 05:25    TPro  7.4  /  Alb  4.6  /  TBili  0.3<L>  /  DBili  x   /  AST  27  /  ALT  25  /  AlkPhos  58  11-24                            13.6   6.47  )-----------( 194      ( 26 Nov 2023 05:25 )             40.9       Exam:  Gen: pt lying in bed, alert, in NAD  Resp: unlabored  CVS: RRR  Abd: soft, NT, ND  Ext: moving all extremities spontaneously, sensation intact, pulses 2+ DP and PT, no erythema of bilateral lower extremities. No tenderness to palpation of bilateral lower extremities. LLE mild edema when compared to RLE.

## 2023-11-26 NOTE — DISCHARGE NOTE PROVIDER - HOSPITAL COURSE
77 yo female with pmhx HTN, HLD, DVT on Eliquis since 2017, Prothrombin gene mutation, hypothyroidism admitted to Medicine for acute DVT. US doppler was performed which revealed acute DVT of left leg. The patient was started on Heparin gtt. Hematology was consulted and recommended testing for cardiolipin ab and beta 2 glycoprotein to determine which anticoagulant the patient would need to be on indefinitely. 79 yo Man with HTN, HLD, DVT on Eliquis since 2017, Prothrombin gene mutation, hypothyroidism admitted to Medicine for acute DVT. US doppler was performed which revealed acute DVT of left leg. The patient was started on Heparin gtt. Hematology was consulted and recommended testing for cardiolipin ab and beta 2 glycoprotein, which came back negative. The patient was started on Eliquis 10mg PO BID x 7  days, then will transition to 5mg BID thereafter. Stay complicated by a sinus pause of 4.7 seconds on telemetry (asymptomatic) - labetalol stopped per cardiology. No further pauses recorded on telemetry. Patient will be discharged with close follow up with cardiology. 77 yo Man with HTN, HLD, DVT on Eliquis since 2017, Prothrombin gene mutation, hypothyroidism admitted to Medicine for acute DVT. US doppler was performed which revealed acute DVT of left leg. The patient was started on Heparin gtt. Hematology was consulted and recommended testing for cardiolipin ab and beta 2 glycoprotein, which came back negative. The patient was started on Eliquis 10mg PO BID x 7  days, then will transition to 5mg BID thereafter. Stay complicated by a sinus pause of 4.7 seconds on telemetry (asymptomatic) - labetalol stopped per cardiology. No further pauses recorded on telemetry. Patient will be discharged with close follow up with cardiology.

## 2023-11-26 NOTE — DISCHARGE NOTE PROVIDER - NSDCMRMEDTOKEN_GEN_ALL_CORE_FT
aspirin 81 mg oral tablet: 1 tab(s) orally once a day  Eliquis 2.5 mg oral tablet: 1 tab(s) orally 2 times a day  labetalol 100 mg oral tablet: 1 tab(s) orally 2 times a day  Livalo 4 mg oral tablet: 1 tab(s) orally once a day  Synthroid 150 mcg (0.15 mg) oral tablet: 1 tab(s) orally once a day  valsartan-hydrochlorothiazide 320 mg-12.5 mg oral tablet: 1 tab(s) orally once a day   aspirin 81 mg oral tablet: 1 tab(s) orally once a day  Eliquis 5 mg oral tablet: 1 tab(s) orally 2 times a day  Eliquis Starter Pack for Treatment of DVT and PE 5 mg oral tablet: 2 tab(s) orally every 12 hours  Livalo 4 mg oral tablet: 1 tab(s) orally once a day  Synthroid 150 mcg (0.15 mg) oral tablet: 1 tab(s) orally once a day  valsartan-hydrochlorothiazide 320 mg-12.5 mg oral tablet: 1 tab(s) orally once a day   aspirin 81 mg oral tablet: 1 tab(s) orally once a day  Eliquis 5 mg oral tablet: 1 tab(s) orally 2 times a day  Eliquis Starter Pack for Treatment of DVT and PE 5 mg oral tablet: 2 tab(s) orally 2 times a day  Livalo 4 mg oral tablet: 1 tab(s) orally once a day  Synthroid 150 mcg (0.15 mg) oral tablet: 1 tab(s) orally once a day  valsartan-hydrochlorothiazide 320 mg-12.5 mg oral tablet: 1 tab(s) orally once a day

## 2023-11-26 NOTE — PROGRESS NOTE ADULT - SUBJECTIVE AND OBJECTIVE BOX
[facility] Division of Hospital Medicine    Chief Complaint:      SUBJECTIVE / OVERNIGHT EVENTS:    Patient denies chest pain, SOB, abd pain, N/V, fever, chills, dysuria or any other complaints. All remainder ROS negative.     MEDICATIONS  (STANDING):  aspirin enteric coated 81 milliGRAM(s) Oral daily  atorvastatin 20 milliGRAM(s) Oral at bedtime  heparin  Infusion.  Unit(s)/Hr (18 mL/Hr) IV Continuous <Continuous>  hydrochlorothiazide 12.5 milliGRAM(s) Oral daily  labetalol 100 milliGRAM(s) Oral <User Schedule>  levothyroxine 150 MICROGram(s) Oral daily  valsartan 320 milliGRAM(s) Oral daily    MEDICATIONS  (PRN):  acetaminophen     Tablet .. 650 milliGRAM(s) Oral every 6 hours PRN Temp greater or equal to 38C (100.4F), Mild Pain (1 - 3)  aluminum hydroxide/magnesium hydroxide/simethicone Suspension 30 milliLiter(s) Oral every 4 hours PRN Dyspepsia  heparin   Injectable 8000 Unit(s) IV Push every 6 hours PRN For aPTT less than 40  heparin   Injectable 4000 Unit(s) IV Push every 6 hours PRN For aPTT between 40 - 57  melatonin 3 milliGRAM(s) Oral at bedtime PRN Insomnia  ondansetron Injectable 4 milliGRAM(s) IV Push every 8 hours PRN Nausea and/or Vomiting        I&O's Summary      PHYSICAL EXAM:  Vital Signs Last 24 Hrs  T(C): 36.4 (26 Nov 2023 05:40), Max: 36.7 (25 Nov 2023 11:22)  T(F): 97.6 (26 Nov 2023 05:40), Max: 98.1 (25 Nov 2023 18:54)  HR: 54 (26 Nov 2023 05:40) (54 - 59)  BP: 146/66 (26 Nov 2023 05:40) (131/68 - 166/70)  BP(mean): 103 (25 Nov 2023 18:54) (103 - 103)  RR: 18 (26 Nov 2023 05:40) (17 - 18)  SpO2: 96% (26 Nov 2023 05:40) (95% - 98%)    Parameters below as of 26 Nov 2023 05:40  Patient On (Oxygen Delivery Method): room air            CONSTITUTIONAL: NAD, well-developed, well-groomed  ENMT: Moist oral mucosa, no pharyngeal injection or exudates; normal dentition  RESPIRATORY: Normal respiratory effort; lungs are clear to auscultation bilaterally  CARDIOVASCULAR: Regular rate and rhythm, normal S1 and S2, no murmur/rub/gallop; No lower extremity edema; Peripheral pulses are 2+ bilaterally  ABDOMEN: Nontender to palpation, normoactive bowel sounds, no rebound/guarding; No hepatosplenomegaly  MUSCLOSKELETAL:  Normal gait; no clubbing or cyanosis of digits; no joint swelling or tenderness to palpation  PSYCH: A+O to person, place, and time; affect appropriate  NEUROLOGY: CN 2-12 are intact and symmetric; no gross sensory deficits;   SKIN: No rashes; no palpable lesions    LABS:                        13.6   6.47  )-----------( 194      ( 26 Nov 2023 05:25 )             40.9     11-26    139  |  102  |  20.4<H>  ----------------------------<  122<H>  4.0   |  24.0  |  1.30    Ca    9.4      26 Nov 2023 05:25    TPro  7.4  /  Alb  4.6  /  TBili  0.3<L>  /  DBili  x   /  AST  27  /  ALT  25  /  AlkPhos  58  11-24    PT/INR - ( 24 Nov 2023 17:15 )   PT: 11.3 sec;   INR: 1.02 ratio         PTT - ( 26 Nov 2023 01:24 )  PTT:76.1 sec      Urinalysis Basic - ( 26 Nov 2023 05:25 )    Color: x / Appearance: x / SG: x / pH: x  Gluc: 122 mg/dL / Ketone: x  / Bili: x / Urobili: x   Blood: x / Protein: x / Nitrite: x   Leuk Esterase: x / RBC: x / WBC x   Sq Epi: x / Non Sq Epi: x / Bacteria: x        CAPILLARY BLOOD GLUCOSE            RADIOLOGY & ADDITIONAL TESTS:  Results Reviewed:   Imaging Personally Reviewed:  Electrocardiogram Personally Reviewed:                                           [facility] Division of Hospital Medicine    SUBJECTIVE / OVERNIGHT EVENTS:    Patient denies chest pain, SOB, abd pain, N/V, fever, chills, dysuria or any other complaints. All remainder ROS negative.     MEDICATIONS  (STANDING):  aspirin enteric coated 81 milliGRAM(s) Oral daily  atorvastatin 20 milliGRAM(s) Oral at bedtime  heparin  Infusion.  Unit(s)/Hr (18 mL/Hr) IV Continuous <Continuous>  hydrochlorothiazide 12.5 milliGRAM(s) Oral daily  labetalol 100 milliGRAM(s) Oral <User Schedule>  levothyroxine 150 MICROGram(s) Oral daily  valsartan 320 milliGRAM(s) Oral daily    MEDICATIONS  (PRN):  acetaminophen     Tablet .. 650 milliGRAM(s) Oral every 6 hours PRN Temp greater or equal to 38C (100.4F), Mild Pain (1 - 3)  aluminum hydroxide/magnesium hydroxide/simethicone Suspension 30 milliLiter(s) Oral every 4 hours PRN Dyspepsia  heparin   Injectable 8000 Unit(s) IV Push every 6 hours PRN For aPTT less than 40  heparin   Injectable 4000 Unit(s) IV Push every 6 hours PRN For aPTT between 40 - 57  melatonin 3 milliGRAM(s) Oral at bedtime PRN Insomnia  ondansetron Injectable 4 milliGRAM(s) IV Push every 8 hours PRN Nausea and/or Vomiting        I&O's Summary      PHYSICAL EXAM:  Vital Signs Last 24 Hrs  T(C): 36.4 (26 Nov 2023 05:40), Max: 36.7 (25 Nov 2023 11:22)  T(F): 97.6 (26 Nov 2023 05:40), Max: 98.1 (25 Nov 2023 18:54)  HR: 54 (26 Nov 2023 05:40) (54 - 59)  BP: 146/66 (26 Nov 2023 05:40) (131/68 - 166/70)  BP(mean): 103 (25 Nov 2023 18:54) (103 - 103)  RR: 18 (26 Nov 2023 05:40) (17 - 18)  SpO2: 96% (26 Nov 2023 05:40) (95% - 98%)    Parameters below as of 26 Nov 2023 05:40  Patient On (Oxygen Delivery Method): room air            General: Age-appearing, in no acute distress  Head: Normocephalic, atraumatic  ENMT: EOMI, no scleral icterus, neck supple, no JVD  Cardiovascular: +S1, S2; Regular rate and rhythm, no murmurs, rubs, gallops  Respiratory: CTAB, no wheezing, no rales, no rhonchi  Gastrointestinal: soft, ND, NT, (+) BS, (-) rebound, (-) fluid wave  Extremities: No clubbing, cyanosis  Vascular: 2+ pulses, cap refill < 2 seconds  Neuro: AAOx3, CN2-12 intact, no FND  Musculoskeletal: Normal tone, no deformities  Skin: Warm, dry, no rash, no jaundice  Psych: Calm, cooperative     LABS:                        13.6   6.47  )-----------( 194      ( 26 Nov 2023 05:25 )             40.9     11-26    139  |  102  |  20.4<H>  ----------------------------<  122<H>  4.0   |  24.0  |  1.30    Ca    9.4      26 Nov 2023 05:25    TPro  7.4  /  Alb  4.6  /  TBili  0.3<L>  /  DBili  x   /  AST  27  /  ALT  25  /  AlkPhos  58  11-24    PT/INR - ( 24 Nov 2023 17:15 )   PT: 11.3 sec;   INR: 1.02 ratio         PTT - ( 26 Nov 2023 01:24 )  PTT:76.1 sec      Urinalysis Basic - ( 26 Nov 2023 05:25 )    Color: x / Appearance: x / SG: x / pH: x  Gluc: 122 mg/dL / Ketone: x  / Bili: x / Urobili: x   Blood: x / Protein: x / Nitrite: x   Leuk Esterase: x / RBC: x / WBC x   Sq Epi: x / Non Sq Epi: x / Bacteria: x        CAPILLARY BLOOD GLUCOSE            RADIOLOGY & ADDITIONAL TESTS:  Results Reviewed:   Imaging Personally Reviewed:  Electrocardiogram Personally Reviewed:

## 2023-11-26 NOTE — DISCHARGE NOTE PROVIDER - CARE PROVIDER_API CALL
Eulogio Matias  Cardiology  [address] [place] [address].  [place] [address]  Phone: [phone]  Fax: [phone]  Follow Up Time: 2 weeks

## 2023-11-26 NOTE — PROGRESS NOTE ADULT - ATTENDING COMMENTS
pt with hypercoagulable state , previous DVT 2017 I reviewed imaging acute thrombus from CFV down to tibials . was on eliquis 5 mg BID ~ 2 yrs ago reduced to 2.5 mg BID . presents with left knee pain . similar to previous DVT pain . found to have FV DVT . appears acute to sub acute. reports have ultrasound in 2017 following AC with Dr mcnulty and their was previous resolution of thrombus so likely acute . recommend 5 mg BID eliquis , f/u with hemonc
AC choice will deferr to hemonc , please call with further questions

## 2023-11-26 NOTE — DISCHARGE NOTE PROVIDER - NSDCCPCAREPLAN_GEN_ALL_CORE_FT
PRINCIPAL DISCHARGE DIAGNOSIS  Diagnosis: Acute deep vein thrombosis (DVT)  Assessment and Plan of Treatment:      PRINCIPAL DISCHARGE DIAGNOSIS  Diagnosis: Acute deep vein thrombosis (DVT)  Assessment and Plan of Treatment: Please take Eliquis 10mg twice daily for 6 more days, then transition to 5mg twice daily thereafter. Follow up with cardiology.

## 2023-11-26 NOTE — DISCHARGE NOTE PROVIDER - NSDCFUSCHEDAPPT_GEN_ALL_CORE_FT
Rafa Fairbanks  [facility]  [facility] [address]  Scheduled Appointment: 01/04/2024

## 2023-11-27 LAB
ANION GAP SERPL CALC-SCNC: 13 MMOL/L — SIGNIFICANT CHANGE UP (ref 5–17)
ANION GAP SERPL CALC-SCNC: 13 MMOL/L — SIGNIFICANT CHANGE UP (ref 5–17)
ANION GAP SERPL CALC-SCNC: 14 MMOL/L — SIGNIFICANT CHANGE UP (ref 5–17)
ANION GAP SERPL CALC-SCNC: 14 MMOL/L — SIGNIFICANT CHANGE UP (ref 5–17)
APTT BLD: 64.7 SEC — HIGH (ref 24.5–35.6)
APTT BLD: 64.7 SEC — HIGH (ref 24.5–35.6)
APTT BLD: 67.4 SEC — HIGH (ref 24.5–35.6)
APTT BLD: 67.4 SEC — HIGH (ref 24.5–35.6)
B2 GLYCOPROT1 AB SER QL: NEGATIVE — SIGNIFICANT CHANGE UP
B2 GLYCOPROT1 AB SER QL: NEGATIVE — SIGNIFICANT CHANGE UP
BUN SERPL-MCNC: 24.2 MG/DL — HIGH (ref 8–20)
BUN SERPL-MCNC: 24.2 MG/DL — HIGH (ref 8–20)
BUN SERPL-MCNC: 24.9 MG/DL — HIGH (ref 8–20)
BUN SERPL-MCNC: 24.9 MG/DL — HIGH (ref 8–20)
CALCIUM SERPL-MCNC: 9.2 MG/DL — SIGNIFICANT CHANGE UP (ref 8.4–10.5)
CALCIUM SERPL-MCNC: 9.2 MG/DL — SIGNIFICANT CHANGE UP (ref 8.4–10.5)
CALCIUM SERPL-MCNC: 9.5 MG/DL — SIGNIFICANT CHANGE UP (ref 8.4–10.5)
CALCIUM SERPL-MCNC: 9.5 MG/DL — SIGNIFICANT CHANGE UP (ref 8.4–10.5)
CARDIOLIPIN AB SER-ACNC: NEGATIVE — SIGNIFICANT CHANGE UP
CARDIOLIPIN AB SER-ACNC: NEGATIVE — SIGNIFICANT CHANGE UP
CHLORIDE SERPL-SCNC: 103 MMOL/L — SIGNIFICANT CHANGE UP (ref 96–108)
CO2 SERPL-SCNC: 23 MMOL/L — SIGNIFICANT CHANGE UP (ref 22–29)
CO2 SERPL-SCNC: 23 MMOL/L — SIGNIFICANT CHANGE UP (ref 22–29)
CO2 SERPL-SCNC: 24 MMOL/L — SIGNIFICANT CHANGE UP (ref 22–29)
CO2 SERPL-SCNC: 24 MMOL/L — SIGNIFICANT CHANGE UP (ref 22–29)
CREAT SERPL-MCNC: 1.41 MG/DL — HIGH (ref 0.5–1.3)
CREAT SERPL-MCNC: 1.41 MG/DL — HIGH (ref 0.5–1.3)
CREAT SERPL-MCNC: 1.43 MG/DL — HIGH (ref 0.5–1.3)
CREAT SERPL-MCNC: 1.43 MG/DL — HIGH (ref 0.5–1.3)
DRVVT RATIO: SIGNIFICANT CHANGE UP (ref 0–1.21)
DRVVT RATIO: SIGNIFICANT CHANGE UP (ref 0–1.21)
DRVVT SCREEN TO CONFIRM RATIO: SIGNIFICANT CHANGE UP
DRVVT SCREEN TO CONFIRM RATIO: SIGNIFICANT CHANGE UP
EGFR: 50 ML/MIN/1.73M2 — LOW
EGFR: 50 ML/MIN/1.73M2 — LOW
EGFR: 51 ML/MIN/1.73M2 — LOW
EGFR: 51 ML/MIN/1.73M2 — LOW
GLUCOSE SERPL-MCNC: 120 MG/DL — HIGH (ref 70–99)
GLUCOSE SERPL-MCNC: 120 MG/DL — HIGH (ref 70–99)
GLUCOSE SERPL-MCNC: 121 MG/DL — HIGH (ref 70–99)
GLUCOSE SERPL-MCNC: 121 MG/DL — HIGH (ref 70–99)
HCT VFR BLD CALC: 38.7 % — LOW (ref 39–50)
HCT VFR BLD CALC: 38.7 % — LOW (ref 39–50)
HGB BLD-MCNC: 12.9 G/DL — LOW (ref 13–17)
HGB BLD-MCNC: 12.9 G/DL — LOW (ref 13–17)
MAGNESIUM SERPL-MCNC: 2.2 MG/DL — SIGNIFICANT CHANGE UP (ref 1.6–2.6)
MAGNESIUM SERPL-MCNC: 2.2 MG/DL — SIGNIFICANT CHANGE UP (ref 1.6–2.6)
MCHC RBC-ENTMCNC: 31.2 PG — SIGNIFICANT CHANGE UP (ref 27–34)
MCHC RBC-ENTMCNC: 31.2 PG — SIGNIFICANT CHANGE UP (ref 27–34)
MCHC RBC-ENTMCNC: 33.3 GM/DL — SIGNIFICANT CHANGE UP (ref 32–36)
MCHC RBC-ENTMCNC: 33.3 GM/DL — SIGNIFICANT CHANGE UP (ref 32–36)
MCV RBC AUTO: 93.7 FL — SIGNIFICANT CHANGE UP (ref 80–100)
MCV RBC AUTO: 93.7 FL — SIGNIFICANT CHANGE UP (ref 80–100)
NORMALIZED SCT PPP-RTO: SIGNIFICANT CHANGE UP
NORMALIZED SCT PPP-RTO: SIGNIFICANT CHANGE UP
NORMALIZED SCT PPP-RTO: SIGNIFICANT CHANGE UP (ref 0–1.16)
NORMALIZED SCT PPP-RTO: SIGNIFICANT CHANGE UP (ref 0–1.16)
PHOSPHATE SERPL-MCNC: 4.1 MG/DL — SIGNIFICANT CHANGE UP (ref 2.4–4.7)
PHOSPHATE SERPL-MCNC: 4.1 MG/DL — SIGNIFICANT CHANGE UP (ref 2.4–4.7)
PLATELET # BLD AUTO: 177 K/UL — SIGNIFICANT CHANGE UP (ref 150–400)
PLATELET # BLD AUTO: 177 K/UL — SIGNIFICANT CHANGE UP (ref 150–400)
POTASSIUM SERPL-MCNC: 4 MMOL/L — SIGNIFICANT CHANGE UP (ref 3.5–5.3)
POTASSIUM SERPL-MCNC: 4 MMOL/L — SIGNIFICANT CHANGE UP (ref 3.5–5.3)
POTASSIUM SERPL-MCNC: 4.2 MMOL/L — SIGNIFICANT CHANGE UP (ref 3.5–5.3)
POTASSIUM SERPL-MCNC: 4.2 MMOL/L — SIGNIFICANT CHANGE UP (ref 3.5–5.3)
POTASSIUM SERPL-SCNC: 4 MMOL/L — SIGNIFICANT CHANGE UP (ref 3.5–5.3)
POTASSIUM SERPL-SCNC: 4 MMOL/L — SIGNIFICANT CHANGE UP (ref 3.5–5.3)
POTASSIUM SERPL-SCNC: 4.2 MMOL/L — SIGNIFICANT CHANGE UP (ref 3.5–5.3)
POTASSIUM SERPL-SCNC: 4.2 MMOL/L — SIGNIFICANT CHANGE UP (ref 3.5–5.3)
RBC # BLD: 4.13 M/UL — LOW (ref 4.2–5.8)
RBC # BLD: 4.13 M/UL — LOW (ref 4.2–5.8)
RBC # FLD: 14 % — SIGNIFICANT CHANGE UP (ref 10.3–14.5)
RBC # FLD: 14 % — SIGNIFICANT CHANGE UP (ref 10.3–14.5)
SODIUM SERPL-SCNC: 140 MMOL/L — SIGNIFICANT CHANGE UP (ref 135–145)
TROPONIN T, HIGH SENSITIVITY RESULT: 15 NG/L — SIGNIFICANT CHANGE UP (ref 0–51)
TROPONIN T, HIGH SENSITIVITY RESULT: 15 NG/L — SIGNIFICANT CHANGE UP (ref 0–51)
TROPONIN T, HIGH SENSITIVITY RESULT: 16 NG/L — SIGNIFICANT CHANGE UP (ref 0–51)
TROPONIN T, HIGH SENSITIVITY RESULT: 16 NG/L — SIGNIFICANT CHANGE UP (ref 0–51)
TSH SERPL-MCNC: 6.82 UIU/ML — HIGH (ref 0.27–4.2)
TSH SERPL-MCNC: 6.82 UIU/ML — HIGH (ref 0.27–4.2)
WBC # BLD: 6.1 K/UL — SIGNIFICANT CHANGE UP (ref 3.8–10.5)
WBC # BLD: 6.1 K/UL — SIGNIFICANT CHANGE UP (ref 3.8–10.5)
WBC # FLD AUTO: 6.1 K/UL — SIGNIFICANT CHANGE UP (ref 3.8–10.5)
WBC # FLD AUTO: 6.1 K/UL — SIGNIFICANT CHANGE UP (ref 3.8–10.5)

## 2023-11-27 PROCEDURE — 99233 SBSQ HOSP IP/OBS HIGH 50: CPT

## 2023-11-27 PROCEDURE — 93010 ELECTROCARDIOGRAM REPORT: CPT

## 2023-11-27 RX ORDER — ATROPINE SULFATE 0.1 MG/ML
0.5 SYRINGE (ML) INJECTION ONCE
Refills: 0 | Status: DISCONTINUED | OUTPATIENT
Start: 2023-11-27 | End: 2023-11-28

## 2023-11-27 RX ORDER — APIXABAN 2.5 MG/1
10 TABLET, FILM COATED ORAL EVERY 12 HOURS
Refills: 0 | Status: DISCONTINUED | OUTPATIENT
Start: 2023-11-27 | End: 2023-11-28

## 2023-11-27 RX ORDER — ENOXAPARIN SODIUM 100 MG/ML
90 INJECTION SUBCUTANEOUS EVERY 12 HOURS
Refills: 0 | Status: DISCONTINUED | OUTPATIENT
Start: 2023-11-27 | End: 2023-11-27

## 2023-11-27 RX ADMIN — APIXABAN 10 MILLIGRAM(S): 2.5 TABLET, FILM COATED ORAL at 17:17

## 2023-11-27 RX ADMIN — VALSARTAN 320 MILLIGRAM(S): 80 TABLET ORAL at 05:54

## 2023-11-27 RX ADMIN — HEPARIN SODIUM 1200 UNIT(S)/HR: 5000 INJECTION INTRAVENOUS; SUBCUTANEOUS at 05:55

## 2023-11-27 RX ADMIN — Medication 150 MICROGRAM(S): at 05:54

## 2023-11-27 RX ADMIN — Medication 81 MILLIGRAM(S): at 11:25

## 2023-11-27 RX ADMIN — HEPARIN SODIUM 1200 UNIT(S)/HR: 5000 INJECTION INTRAVENOUS; SUBCUTANEOUS at 07:39

## 2023-11-27 RX ADMIN — POLYETHYLENE GLYCOL 3350 17 GRAM(S): 17 POWDER, FOR SOLUTION ORAL at 11:26

## 2023-11-27 NOTE — PROGRESS NOTE ADULT - SUBJECTIVE AND OBJECTIVE BOX
[facility] Division of Hospital Medicine  Alf Buckley MD, PRIYANK  I'm reachable on [employer] Teams    Patient is a 78y old  Male who presents with a chief complaint of Acute DVT (27 Nov 2023 10:30)      SUBJECTIVE / OVERNIGHT EVENTS:  Had an episode of sinus pause of 4.7 seconds overnight. Asymptomatic. Currently sitting at bedside, no complaints.     MEDICATIONS  (STANDING):  aspirin enteric coated 81 milliGRAM(s) Oral daily  atorvastatin 20 milliGRAM(s) Oral at bedtime  enoxaparin Injectable 90 milliGRAM(s) SubCutaneous every 12 hours  heparin  Infusion.  Unit(s)/Hr (18 mL/Hr) IV Continuous <Continuous>  hydrochlorothiazide 12.5 milliGRAM(s) Oral daily  levothyroxine 150 MICROGram(s) Oral daily  polyethylene glycol 3350 17 Gram(s) Oral daily  valsartan 320 milliGRAM(s) Oral daily    MEDICATIONS  (PRN):  acetaminophen     Tablet .. 650 milliGRAM(s) Oral every 6 hours PRN Temp greater or equal to 38C (100.4F), Mild Pain (1 - 3)  aluminum hydroxide/magnesium hydroxide/simethicone Suspension 30 milliLiter(s) Oral every 4 hours PRN Dyspepsia  atropine Injectable 0.5 milliGRAM(s) IntraMuscular once PRN HR<30 for 5 minutes  heparin   Injectable 8000 Unit(s) IV Push every 6 hours PRN For aPTT less than 40  heparin   Injectable 4000 Unit(s) IV Push every 6 hours PRN For aPTT between 40 - 57  melatonin 3 milliGRAM(s) Oral at bedtime PRN Insomnia  ondansetron Injectable 4 milliGRAM(s) IV Push every 8 hours PRN Nausea and/or Vomiting    CAPILLARY BLOOD GLUCOSE        I&O's Summary    26 Nov 2023 07:01  -  27 Nov 2023 07:00  --------------------------------------------------------  IN: 852 mL / OUT: 0 mL / NET: 852 mL        PHYSICAL EXAM:  Vital Signs Last 24 Hrs  T(C): 36.7 (27 Nov 2023 05:15), Max: 36.7 (26 Nov 2023 17:00)  T(F): 98 (27 Nov 2023 05:15), Max: 98.1 (26 Nov 2023 17:00)  HR: 54 (27 Nov 2023 05:15) (53 - 62)  BP: 138/71 (27 Nov 2023 05:15) (106/58 - 140/65)  BP(mean): --  RR: 18 (27 Nov 2023 05:15) (18 - 18)  SpO2: 96% (27 Nov 2023 05:15) (93% - 97%)    Parameters below as of 27 Nov 2023 05:15  Patient On (Oxygen Delivery Method): room air      CONSTITUTIONAL: NAD, well-developed, well-groomed  EYES:  EOMI, conjunctiva and sclera clear  ENMT: Moist oral mucosa  NECK: Supple, no JVD  RESPIRATORY: Normal respiratory effort; lungs are clear to auscultation bilaterally  CARDIOVASCULAR: bradycardic 50s, normal S1 and S2, no murmur/rub/gallop; No lower extremity edema  ABDOMEN: normoactive bowel sounds, soft, nontender to palpation, no distension   MUSCULOSKELETAL:  no clubbing or cyanosis of digits; no joint swelling or tenderness to palpation  PSYCH: A+O x3; affect appropriate, calm and cooperative  NEUROLOGY: CN 2-12 are intact and symmetric; no gross sensory deficits     LABS:                        12.9   6.10  )-----------( 177      ( 27 Nov 2023 01:55 )             38.7     11-27    140  |  103  |  24.2<H>  ----------------------------<  120<H>  4.2   |  23.0  |  1.41<H>    Ca    9.5      27 Nov 2023 04:50  Phos  4.1     11-27  Mg     2.2     11-27      PTT - ( 27 Nov 2023 04:50 )  PTT:64.7 sec      Urinalysis Basic - ( 27 Nov 2023 04:50 )    Color: x / Appearance: x / SG: x / pH: x  Gluc: 120 mg/dL / Ketone: x  / Bili: x / Urobili: x   Blood: x / Protein: x / Nitrite: x   Leuk Esterase: x / RBC: x / WBC x   Sq Epi: x / Non Sq Epi: x / Bacteria: x       [facility] Division of Hospital Medicine  Alf Buckley MD, PRIYANK  I'm reachable on [employer] Teams    Patient is a 78y old  Male who presents with a chief complaint of Acute DVT (27 Nov 2023 10:30)      SUBJECTIVE / OVERNIGHT EVENTS:  Had an episode of sinus pause of 4.7 seconds overnight. Asymptomatic. Currently sitting at bedside, no complaints.     MEDICATIONS  (STANDING):  aspirin enteric coated 81 milliGRAM(s) Oral daily  atorvastatin 20 milliGRAM(s) Oral at bedtime  enoxaparin Injectable 90 milliGRAM(s) SubCutaneous every 12 hours  heparin  Infusion.  Unit(s)/Hr (18 mL/Hr) IV Continuous <Continuous>  hydrochlorothiazide 12.5 milliGRAM(s) Oral daily  levothyroxine 150 MICROGram(s) Oral daily  polyethylene glycol 3350 17 Gram(s) Oral daily  valsartan 320 milliGRAM(s) Oral daily    MEDICATIONS  (PRN):  acetaminophen     Tablet .. 650 milliGRAM(s) Oral every 6 hours PRN Temp greater or equal to 38C (100.4F), Mild Pain (1 - 3)  aluminum hydroxide/magnesium hydroxide/simethicone Suspension 30 milliLiter(s) Oral every 4 hours PRN Dyspepsia  atropine Injectable 0.5 milliGRAM(s) IntraMuscular once PRN HR<30 for 5 minutes  heparin   Injectable 8000 Unit(s) IV Push every 6 hours PRN For aPTT less than 40  heparin   Injectable 4000 Unit(s) IV Push every 6 hours PRN For aPTT between 40 - 57  melatonin 3 milliGRAM(s) Oral at bedtime PRN Insomnia  ondansetron Injectable 4 milliGRAM(s) IV Push every 8 hours PRN Nausea and/or Vomiting    CAPILLARY BLOOD GLUCOSE        I&O's Summary    26 Nov 2023 07:01  -  27 Nov 2023 07:00  --------------------------------------------------------  IN: 852 mL / OUT: 0 mL / NET: 852 mL        PHYSICAL EXAM:  Vital Signs Last 24 Hrs  T(C): 36.7 (27 Nov 2023 05:15), Max: 36.7 (26 Nov 2023 17:00)  T(F): 98 (27 Nov 2023 05:15), Max: 98.1 (26 Nov 2023 17:00)  HR: 54 (27 Nov 2023 05:15) (53 - 62)  BP: 138/71 (27 Nov 2023 05:15) (106/58 - 140/65)  BP(mean): --  RR: 18 (27 Nov 2023 05:15) (18 - 18)  SpO2: 96% (27 Nov 2023 05:15) (93% - 97%)    Parameters below as of 27 Nov 2023 05:15  Patient On (Oxygen Delivery Method): room air      CONSTITUTIONAL: NAD, well-developed, well-groomed  EYES:  EOMI, conjunctiva and sclera clear  ENMT: Moist oral mucosa  NECK: Supple, no JVD  RESPIRATORY: Normal respiratory effort; lungs are clear to auscultation bilaterally  CARDIOVASCULAR: bradycardic 50s, normal S1 and S2, no murmur/rub/gallop; No lower extremity edema  ABDOMEN: normoactive bowel sounds, soft, nontender to palpation, no distension   MUSCULOSKELETAL:  no clubbing or cyanosis of digits; no joint swelling or tenderness to palpation  PSYCH: A+O x3; affect appropriate, calm and cooperative  NEUROLOGY: CN 2-12 are intact and symmetric; no gross sensory deficits     LABS:                        12.9   6.10  )-----------( 177      ( 27 Nov 2023 01:55 )             38.7     11-27    140  |  103  |  24.2<H>  ----------------------------<  120<H>  4.2   |  23.0  |  1.41<H>    Ca    9.5      27 Nov 2023 04:50  Phos  4.1     11-27  Mg     2.2     11-27      PTT - ( 27 Nov 2023 04:50 )  PTT:64.7 sec      Urinalysis Basic - ( 27 Nov 2023 04:50 )    Color: x / Appearance: x / SG: x / pH: x  Gluc: 120 mg/dL / Ketone: x  / Bili: x / Urobili: x   Blood: x / Protein: x / Nitrite: x   Leuk Esterase: x / RBC: x / WBC x   Sq Epi: x / Non Sq Epi: x / Bacteria: x

## 2023-11-27 NOTE — PROGRESS NOTE ADULT - ASSESSMENT
79 yo Man w/ HTN, HLD, DVT on Eliquis since 2017, Prothrombin gene mutation, hypothyroidism presenting to the ED with complaint of left knee pain for 1 day.    Acute DVT; Prothrombin gene mutation  -Patient has been compliant with Eliquis without any missed doses, however, he has been taking the 2.5 dose and does not seem to meet criteria for reduced dose.   -Heparin gtt for now - transition to Lovenox 90mg sq q12h  - Per heme recs, pending anticardiolipin ab and beta2 glycoprotein. Decision to start lovenox/coumadin vs Eliquis will be after the blood work is back, which could be done outpatient.   -Vascular surgery consulted, recs noted.    Sinus pause   - 4.7 seconds on telemetry overnight, asymptomatic.   - Pacer pads on just in case. Per cardiology, discontinue beta blocker.   - Will need to rule out JAVIER / sleep study outpatient (patient aware).   - If no further events on tele overnight, will likely discharge home. Monitor BP off Labetalol.     HTN, HLD  -Continue Valsartan 320, HCTZ 12.5, stopped Labetalol 100 mg BID due to significant sinus pause.   - monitor vital signs.   -Continue statin- Atorvastatin as formulary sub for Pitavastatin  -Continue Aspirin 81 mg     Hypothyroidism  -Continue Synthroid 150 mcg daily  -TSH 6.82 - follow up with outpatient provider    Constipation  -Trial of Miralax    DVTppx: Heparin gtt --> lovenox 90 mg bid   GOC: Full Code

## 2023-11-27 NOTE — CHART NOTE - NSCHARTNOTEFT_GEN_A_CORE
Contacted by RN due to patient with a 4.7 second pause on monitor. Patient admitted on 11/24 with L knee pain, found to have acute DVT now on heparin gtt. PMHx of HTN, HLD, DVT on eliquis since 2017, prothrombin gene mutation, hypothyroidism.     Since patient has been admitted, HR on tele monitor ranges from 55-60 baseline. Patient seen and examined at bedside, reports he was sleeping while this happened and has no complaints. Patient states he does see [facility] cardio outpatient, Dr. Matias for his HTN and HLD. Patient denies history of Afib or any arrhythmias however states he thinks in the past this has happened before to him. Patient is A&Ox4, denies chest pain, palpitations, dizziness, abdominal pain, n/v.    VS: T 98.0F, HR 53, /58, RR 18, SpO2 93% room air    EKG: Sinus bradycardia, 1st degree heart block. No acute st or t wave changes.    A/P: Patient with new 4.7 second pause, asymtomatic, no noted history of arrythmias or pauses.   -BMP/Mag/Phos  -Trop  -TSH  -Pacer pads overnight  -[facility] consult for AM  -Atropine 0.5mg IM PRN for HR<30 for more than 5 minutes    RN and patient updated on plan.  Please call with questions or concerns  Will endorse to day team Contacted by RN due to patient with a 4.7 second pause on monitor. Patient admitted on 11/24 with L knee pain, found to have acute DVT now on heparin gtt. PMHx of HTN, HLD, DVT on eliquis since 2017, prothrombin gene mutation, hypothyroidism.     Since patient has been admitted, HR on tele monitor ranges from 55-60 baseline. Patient seen and examined at bedside, reports he was sleeping while this happened and has no complaints. Patient states he does see [place] cardio outpatient, Dr. Matias for his HTN and HLD. Patient denies history of Afib or any arrhythmias however states he thinks in the past this has happened before to him. Patient is A&Ox4, denies chest pain, palpitations, dizziness, abdominal pain, n/v.    VS: T 98.0F, HR 53, /58, RR 18, SpO2 93% room air    EKG: Sinus bradycardia, 1st degree heart block. No acute st or t wave changes.    A/P: Patient with new 4.7 second pause, asymptomatic, no noted history of arrhythmias or pauses.   -BMP/Mag/Phos  -Trop  -TSH  -Pacer pads overnight  -[place] cardio consult for AM  -Atropine 0.5mg IM PRN for HR<30 for more than 5 minutes    RN and patient updated on plan.  Please call with questions or concerns  Will endorse to day team      Addendum 0320:                        12.9   6.10  )-----------( 177      ( 27 Nov 2023 01:55 )             38.7   11-27    140  |  103  |  24.9<H>  ----------------------------<  121<H>  4.0   |  24.0  |  1.43<H>    Ca    9.2      27 Nov 2023 01:55  Phos  4.1     11-27  Mg     2.2     11-27    High sensitivity Troponin: 16 intermediate  TSH: 6.82    Patient has not had another pause since first one. Resting comfortably in bed. VSS  Troponin to be repeated due to intermediate range on the high sensitivity score. Contacted by RN due to patient with a 4.7 second pause on monitor. Patient admitted on 11/24 with L knee pain, found to have acute DVT now on heparin gtt. PMHx of HTN, HLD, DVT on eliquis since 2017, prothrombin gene mutation, hypothyroidism.     Since patient has been admitted, HR on tele monitor ranges from 55-60 baseline. Patient seen and examined at bedside, reports he was sleeping while this happened and has no complaints. Patient states he does see [place] cardio outpatient, Dr. Matias for his HTN and HLD. Patient denies history of Afib or any arrhythmias however states he thinks in the past this has happened before to him. Patient is A&Ox4, denies chest pain, palpitations, dizziness, abdominal pain, n/v.    VS: T 98.0F, HR 53, /58, RR 18, SpO2 93% room air    EKG: Sinus bradycardia, 1st degree heart block. No acute st or t wave changes.    A/P: Patient with new 4.7 second pause, asymptomatic, no noted history of arrhythmias or pauses.   -BMP/Mag/Phos  -Trop  -TSH  -Pacer pads overnight  -[place] cardio consult for AM  -Atropine 0.5mg IM PRN for HR<30 for more than 5 minutes    RN and patient updated on plan.  Please call with questions or concerns  Will endorse to day team      Addendum 0320:                        12.9   6.10  )-----------( 177      ( 27 Nov 2023 01:55 )             38.7   11-27    140  |  103  |  24.9<H>  ----------------------------<  121<H>  4.0   |  24.0  |  1.43<H>    Ca    9.2      27 Nov 2023 01:55  Phos  4.1     11-27  Mg     2.2     11-27    High sensitivity Troponin: 16 intermediate  TSH: 6.82    Patient has not had another pause since first one. Resting comfortably in bed. VSS  Troponin to be repeated due to intermediate range on the high sensitivity score, lab pending  TSH high, known hypothyroidism, pt takes Levothyroxine 150mg daily    Will endorse to day team Contacted by RN due to patient with a 4.7 second pause on monitor. Patient admitted on 11/24 with L knee pain, found to have acute DVT now on heparin gtt. PMHx of HTN, HLD, DVT on eliquis since 2017, prothrombin gene mutation, hypothyroidism.     Since patient has been admitted, HR on tele monitor ranges from 55-60 baseline. Patient seen and examined at bedside, reports he was sleeping while this happened and has no complaints. Patient states he does see [facility] cardio outpatient, Dr. Matias for his HTN and HLD. Patient denies history of Afib or any arrhythmias however states he thinks in the past this has happened before to him. Patient is A&Ox4, denies chest pain, palpitations, dizziness, abdominal pain, n/v.    VS: T 98.0F, HR 53, /58, RR 18, SpO2 93% room air    EKG: Sinus bradycardia, 1st degree heart block. No acute st or t wave changes.    A/P: Patient with new 4.7 second pause, asymptomatic, no noted history of arrhythmias or pauses.   -BMP/Mag/Phos  -Trop  -TSH  -Pacer pads overnight  -[facility] consult for AM  -Atropine 0.5mg IM PRN for HR<30 for more than 5 minutes    RN and patient updated on plan.  Please call with questions or concerns  Will endorse to day team      Addendum 0320:                        12.9   6.10  )-----------( 177      ( 27 Nov 2023 01:55 )             38.7   11-27    140  |  103  |  24.9<H>  ----------------------------<  121<H>  4.0   |  24.0  |  1.43<H>    Ca    9.2      27 Nov 2023 01:55  Phos  4.1     11-27  Mg     2.2     11-27    High sensitivity Troponin: 16 intermediate  TSH: 6.82    Patient has not had another pause since first one. Resting comfortably in bed. VSS  Troponin to be repeated due to intermediate range 16 on the high sensitivity score, likely renal impairment, repeat Trop pending  TSH high, known hypothyroidism, pt takes Levothyroxine 150mg daily    Will endorse to day team Contacted by RN due to patient with a 4.7 second pause on monitor. Patient admitted on 11/24 with L knee pain, found to have acute DVT now on heparin gtt. PMHx of HTN, HLD, DVT on eliquis since 2017, prothrombin gene mutation, hypothyroidism.     Since patient has been admitted, HR on tele monitor ranges from 55-60 baseline. Patient seen and examined at bedside, reports he was sleeping while this happened and has no complaints. Patient states he does see [place] cardio outpatient, Dr. Matias for his HTN and HLD. Patient denies history of Afib or any arrhythmias however states he thinks in the past this has happened before to him. Patient is A&Ox4, denies chest pain, palpitations, dizziness, abdominal pain, n/v.    VS: T 98.0F, HR 53, /58, RR 18, SpO2 93% room air    EKG: Sinus bradycardia, 1st degree heart block. No acute st or t wave changes.    A/P: Patient with new 4.7 second pause, asymptomatic, no noted history of arrhythmias or pauses.   -BMP/Mag/Phos  -Trop  -TSH  -Pacer pads overnight  -[place] cardio consult for AM  -Atropine 0.5mg IM PRN for HR<30 for more than 5 minutes    RN and patient updated on plan.  Please call with questions or concerns  Will endorse to day team      Addendum 0320:                        12.9   6.10  )-----------( 177      ( 27 Nov 2023 01:55 )             38.7   11-27    140  |  103  |  24.9<H>  ----------------------------<  121<H>  4.0   |  24.0  |  1.43<H>    Ca    9.2      27 Nov 2023 01:55  Phos  4.1     11-27  Mg     2.2     11-27    High sensitivity Troponin: 16 intermediate  TSH: 6.82    Patient has not had another pause since first one. Resting comfortably in bed. VSS  Troponin to be repeated due to intermediate range 16 on the high sensitivity score, likely renal impairment, repeat Trop 15.   TSH high, known hypothyroidism, pt takes Levothyroxine 150mg daily    Will endorse to day team

## 2023-11-27 NOTE — CONSULT NOTE ADULT - SUBJECTIVE AND OBJECTIVE BOX
[facility], THE [facility]                                   [address] [place] [address]                                                      PHONE: [phone]                                                         FAX: [phone]  [email or web address]  ---------------------------------------------------------------------------------------------------------------------------------    HPI:  ENRIKE FLORENTINO is an 78y Male with a hx of htn, hld, dvt on eliquis 2.5mg bid, prothrombin gene mutation p/w L knee pain found to have a new dvt.  Pt reports no hx of JAVIER.  He has no hx of chest pain, tierney, orthopnea, palpitations, lightheadedness/dizziness, syncope.  He was found to have 4.7s pause last night while sleeping.     PAST MEDICAL & SURGICAL HISTORY:  Hypothyroid      HTN (hypertension)      DVT of lower limb, acute      HLD (hyperlipidemia)      No significant past surgical history          No Known Allergies      MEDICATIONS  (STANDING):  aspirin enteric coated 81 milliGRAM(s) Oral daily  atorvastatin 20 milliGRAM(s) Oral at bedtime  heparin  Infusion.  Unit(s)/Hr (18 mL/Hr) IV Continuous <Continuous>  hydrochlorothiazide 12.5 milliGRAM(s) Oral daily  labetalol 100 milliGRAM(s) Oral <User Schedule>  levothyroxine 150 MICROGram(s) Oral daily  polyethylene glycol 3350 17 Gram(s) Oral daily  valsartan 320 milliGRAM(s) Oral daily    MEDICATIONS  (PRN):  acetaminophen     Tablet .. 650 milliGRAM(s) Oral every 6 hours PRN Temp greater or equal to 38C (100.4F), Mild Pain (1 - 3)  aluminum hydroxide/magnesium hydroxide/simethicone Suspension 30 milliLiter(s) Oral every 4 hours PRN Dyspepsia  atropine Injectable 0.5 milliGRAM(s) IntraMuscular once PRN HR<30 for 5 minutes  heparin   Injectable 8000 Unit(s) IV Push every 6 hours PRN For aPTT less than 40  heparin   Injectable 4000 Unit(s) IV Push every 6 hours PRN For aPTT between 40 - 57  melatonin 3 milliGRAM(s) Oral at bedtime PRN Insomnia  ondansetron Injectable 4 milliGRAM(s) IV Push every 8 hours PRN Nausea and/or Vomiting      Family History: Pt denies hx of early cad, SCD, or congenital heart disease.      Social History:  Cigarettes:                    Alchohol:   no              Illicit Drug Abuse:  no    ROS:    Extensively Reviewed with pertinents as per HPI the remainder were negative.      Vital Signs Last 24 Hrs  T(C): 36.7 (27 Nov 2023 05:15), Max: 36.7 (26 Nov 2023 17:00)  T(F): 98 (27 Nov 2023 05:15), Max: 98.1 (26 Nov 2023 17:00)  HR: 54 (27 Nov 2023 05:15) (53 - 62)  BP: 138/71 (27 Nov 2023 05:15) (106/58 - 140/65)  BP(mean): --  RR: 18 (27 Nov 2023 05:15) (18 - 18)  SpO2: 96% (27 Nov 2023 05:15) (93% - 97%)    Parameters below as of 27 Nov 2023 05:15  Patient On (Oxygen Delivery Method): room air      ICU Vital Signs Last 24 Hrs  ENRIKE FLORENTINO  I&O's Detail    26 Nov 2023 07:01  -  27 Nov 2023 07:00  --------------------------------------------------------  IN:    Heparin Infusion: 132 mL    Oral Fluid: 720 mL  Total IN: 852 mL    OUT:  Total OUT: 0 mL    Total NET: 852 mL        I&O's Summary    26 Nov 2023 07:01  -  27 Nov 2023 07:00  --------------------------------------------------------  IN: 852 mL / OUT: 0 mL / NET: 852 mL      Drug Dosing Weight  ENRIKE FLORENTINO      PHYSICAL EXAM:  General: Appears well developed, alert and cooperative.  HEENT: Head; normocephalic, atraumatic.  Eyes: Pupils reactive, cornea wnl.  Neck: Supple, no nodes adenopathy, no JVD, no carotid bruit  CARDIOVASCULAR: Normal S1 and S2, No murmur, rub, gallop or lift.   LUNGS: No rales, rhonchi or wheeze. Normal breath sounds bilaterally.  ABDOMEN: Soft, nontender, nondistended  EXTREMITIES: No clubbing or edema. Distal pulses wnl.   SKIN: warm and dry with normal turgor.  NEURO: Alert/oriented x 3/normal motor exam.   PSYCH: normal affect.        LABS:                        12.9   6.10  )-----------( 177      ( 27 Nov 2023 01:55 )             38.7     11-27    140  |  103  |  24.2<H>  ----------------------------<  120<H>  4.2   |  23.0  |  1.41<H>    Ca    9.5      27 Nov 2023 04:50  Phos  4.1     11-27  Mg     2.2     11-27      ENRIKE FLORENTINO      PTT - ( 27 Nov 2023 04:50 )  PTT:64.7 sec  Urinalysis Basic - ( 27 Nov 2023 04:50 )    Color: x / Appearance: x / SG: x / pH: x  Gluc: 120 mg/dL / Ketone: x  / Bili: x / Urobili: x   Blood: x / Protein: x / Nitrite: x   Leuk Esterase: x / RBC: x / WBC x   Sq Epi: x / Non Sq Epi: x / Bacteria: x        RADIOLOGY & ADDITIONAL STUDIES:    INTERPRETATION OF TELEMETRY (personally reviewed): 1st degree av block, Wenckebach, 4.7 s sinus pause at 3AM     ECG:     ECHO:    STRESS TEST:    CARDIAC CATHETERIZATION:    Assessment and Plan:  In summary, ENRIKE FLORENTINO is an 78y Male with a hx of htn, hld, dvt on eliquis 2.5mg bid, prothrombin gene mutation p/w L knee pain found to have a new dvt.    DVT- would consider restarted eliquis at 10mg bid for 7 days and then 5mg bid after due to new DVT.  Would continue at 5mg bid indefinitely after the week long loading dose.  Hematology consult pending.     Sinus pause- while pt was sleeping.  It appears to be vagal mediated.  Pt reports snoring some nights.  It is possible that he has JAVIER.  Would recommend outpatient sleep study.  Can dc labetalol and increase other antihypertensives such as hctz or add ca blocker if necessary.       Thank you for allowing [facility] to participate in the care of this patient.  Please feel free to call with any questions or concerns.

## 2023-11-27 NOTE — CHART NOTE - NSCHARTNOTEFT_GEN_A_CORE
Addendum Note - Medicine Attending:  ========================================================    anticardiolipin ab and beta2 glycoprotein are both negative  no need for lovenox to coumadin bridge  can start Eliquis tonight (first 7 days loading dose)    If the patient has no further significant events on telemetry overnight, will dc to home tomorrow morning.         Alf Buckley MD, PRIYANK  Available on [employer] Teams

## 2023-11-27 NOTE — PROGRESS NOTE ADULT - TIME BILLING
Time spent reviewing the chart documentation, reviewing labs and imaging studies, evaluating the patient, discussing the plan of care with medical team, and documenting.

## 2023-11-28 ENCOUNTER — TRANSCRIPTION ENCOUNTER (OUTPATIENT)
Age: 78
End: 2023-11-28

## 2023-11-28 VITALS
HEART RATE: 56 BPM | SYSTOLIC BLOOD PRESSURE: 135 MMHG | OXYGEN SATURATION: 95 % | TEMPERATURE: 98 F | DIASTOLIC BLOOD PRESSURE: 76 MMHG | RESPIRATION RATE: 18 BRPM

## 2023-11-28 LAB
APTT BLD: 34 SEC — SIGNIFICANT CHANGE UP (ref 24.5–35.6)
APTT BLD: 34 SEC — SIGNIFICANT CHANGE UP (ref 24.5–35.6)
HCT VFR BLD CALC: 40 % — SIGNIFICANT CHANGE UP (ref 39–50)
HCT VFR BLD CALC: 40 % — SIGNIFICANT CHANGE UP (ref 39–50)
HGB BLD-MCNC: 13.4 G/DL — SIGNIFICANT CHANGE UP (ref 13–17)
HGB BLD-MCNC: 13.4 G/DL — SIGNIFICANT CHANGE UP (ref 13–17)
MCHC RBC-ENTMCNC: 30.9 PG — SIGNIFICANT CHANGE UP (ref 27–34)
MCHC RBC-ENTMCNC: 30.9 PG — SIGNIFICANT CHANGE UP (ref 27–34)
MCHC RBC-ENTMCNC: 33.5 GM/DL — SIGNIFICANT CHANGE UP (ref 32–36)
MCHC RBC-ENTMCNC: 33.5 GM/DL — SIGNIFICANT CHANGE UP (ref 32–36)
MCV RBC AUTO: 92.4 FL — SIGNIFICANT CHANGE UP (ref 80–100)
MCV RBC AUTO: 92.4 FL — SIGNIFICANT CHANGE UP (ref 80–100)
PLATELET # BLD AUTO: 189 K/UL — SIGNIFICANT CHANGE UP (ref 150–400)
PLATELET # BLD AUTO: 189 K/UL — SIGNIFICANT CHANGE UP (ref 150–400)
RBC # BLD: 4.33 M/UL — SIGNIFICANT CHANGE UP (ref 4.2–5.8)
RBC # BLD: 4.33 M/UL — SIGNIFICANT CHANGE UP (ref 4.2–5.8)
RBC # FLD: 13.9 % — SIGNIFICANT CHANGE UP (ref 10.3–14.5)
RBC # FLD: 13.9 % — SIGNIFICANT CHANGE UP (ref 10.3–14.5)
WBC # BLD: 5.76 K/UL — SIGNIFICANT CHANGE UP (ref 3.8–10.5)
WBC # BLD: 5.76 K/UL — SIGNIFICANT CHANGE UP (ref 3.8–10.5)
WBC # FLD AUTO: 5.76 K/UL — SIGNIFICANT CHANGE UP (ref 3.8–10.5)
WBC # FLD AUTO: 5.76 K/UL — SIGNIFICANT CHANGE UP (ref 3.8–10.5)

## 2023-11-28 PROCEDURE — 99239 HOSP IP/OBS DSCHRG MGMT >30: CPT

## 2023-11-28 RX ORDER — APIXABAN 2.5 MG/1
2 TABLET, FILM COATED ORAL
Qty: 24 | Refills: 0
Start: 2023-11-28 | End: 2023-12-03

## 2023-11-28 RX ORDER — LABETALOL HCL 100 MG
1 TABLET ORAL
Refills: 0 | DISCHARGE

## 2023-11-28 RX ORDER — FLUTICASONE PROPIONATE 50 UG/1
50 SPRAY, METERED NASAL
Qty: 3 | Refills: 1 | Status: ACTIVE | COMMUNITY
Start: 2023-04-24 | End: 1900-01-01

## 2023-11-28 RX ORDER — APIXABAN 2.5 MG/1
1 TABLET, FILM COATED ORAL
Refills: 0 | DISCHARGE

## 2023-11-28 RX ADMIN — VALSARTAN 320 MILLIGRAM(S): 80 TABLET ORAL at 05:11

## 2023-11-28 RX ADMIN — APIXABAN 10 MILLIGRAM(S): 2.5 TABLET, FILM COATED ORAL at 05:11

## 2023-11-28 RX ADMIN — Medication 150 MICROGRAM(S): at 05:10

## 2023-11-28 NOTE — PROGRESS NOTE ADULT - SUBJECTIVE AND OBJECTIVE BOX
[facility] [place], THE [facility]                                   [address] [place] [address]                                                      PHONE: [phone]                                                         FAX: [phone]  [email or web address]  ---------------------------------------------------------------------------------------------------------------------------------    Overnight events/patient complaints:  Pt feeling well today.      PAST MEDICAL & SURGICAL HISTORY:  Hypothyroid      HTN (hypertension)      DVT of lower limb, acute      HLD (hyperlipidemia)      No significant past surgical history          No Known Allergies    MEDICATIONS  (STANDING):  apixaban 10 milliGRAM(s) Oral every 12 hours  aspirin enteric coated 81 milliGRAM(s) Oral daily  atorvastatin 20 milliGRAM(s) Oral at bedtime  hydrochlorothiazide 12.5 milliGRAM(s) Oral daily  levothyroxine 150 MICROGram(s) Oral daily  polyethylene glycol 3350 17 Gram(s) Oral daily  valsartan 320 milliGRAM(s) Oral daily    MEDICATIONS  (PRN):  acetaminophen     Tablet .. 650 milliGRAM(s) Oral every 6 hours PRN Temp greater or equal to 38C (100.4F), Mild Pain (1 - 3)  aluminum hydroxide/magnesium hydroxide/simethicone Suspension 30 milliLiter(s) Oral every 4 hours PRN Dyspepsia  atropine Injectable 0.5 milliGRAM(s) IntraMuscular once PRN HR<30 for 5 minutes  heparin   Injectable 8000 Unit(s) IV Push every 6 hours PRN For aPTT less than 40  heparin   Injectable 4000 Unit(s) IV Push every 6 hours PRN For aPTT between 40 - 57  melatonin 3 milliGRAM(s) Oral at bedtime PRN Insomnia  ondansetron Injectable 4 milliGRAM(s) IV Push every 8 hours PRN Nausea and/or Vomiting      Vital Signs Last 24 Hrs  T(C): 36.5 (28 Nov 2023 04:30), Max: 36.6 (27 Nov 2023 13:01)  T(F): 97.7 (28 Nov 2023 04:30), Max: 97.8 (27 Nov 2023 13:01)  HR: 62 (28 Nov 2023 04:30) (54 - 67)  BP: 142/79 (28 Nov 2023 04:30) (132/70 - 154/67)  BP(mean): --  RR: 18 (28 Nov 2023 04:30) (18 - 18)  SpO2: 95% (28 Nov 2023 04:30) (95% - 98%)    Parameters below as of 28 Nov 2023 04:30  Patient On (Oxygen Delivery Method): room air      ICU Vital Signs Last 24 Hrs  ENRIKE FLORENTINO  I&O's Detail    I&O's Summary    Drug Dosing Weight  ENRIKE FLORENTINO      PHYSICAL EXAM:  General: Appears well developed, alert and cooperative.  HEENT: Head; normocephalic, atraumatic.  Eyes: Pupils reactive, cornea wnl.  Neck: Supple, no nodes adenopathy, no JVD, no carotid bruit  CARDIOVASCULAR: Normal S1 and S2, No murmur, rub, gallop or lift.   LUNGS: No rales, rhonchi or wheeze. Normal breath sounds bilaterally.  ABDOMEN: Soft, nontender, nondistended  EXTREMITIES: No clubbing or edema. Distal pulses wnl.   SKIN: warm and dry with normal turgor.  NEURO: Alert/oriented x 3/normal motor exam.   PSYCH: normal affect.        LABS:                        13.4   5.76  )-----------( 189      ( 28 Nov 2023 06:06 )             40.0     11-27    140  |  103  |  24.2<H>  ----------------------------<  120<H>  4.2   |  23.0  |  1.41<H>    Ca    9.5      27 Nov 2023 04:50  Phos  4.1     11-27  Mg     2.2     11-27      ENRIKE FLORENTINO      PTT - ( 28 Nov 2023 06:06 )  PTT:34.0 sec  Urinalysis Basic - ( 27 Nov 2023 04:50 )    Color: x / Appearance: x / SG: x / pH: x  Gluc: 120 mg/dL / Ketone: x  / Bili: x / Urobili: x   Blood: x / Protein: x / Nitrite: x   Leuk Esterase: x / RBC: x / WBC x   Sq Epi: x / Non Sq Epi: x / Bacteria: x        RADIOLOGY & ADDITIONAL STUDIES:    INTERPRETATION OF TELEMETRY (personally reviewed): NSR, pvc, no pauses    ECG:    ECHO:    STRESS TEST:    CARDIAC CATHETERIZATION:    ASSESSMENT AND PLAN:  In summary, ENRIKE FLORENTINO is an 78y Male with a hx of htn, hld, dvt on eliquis 2.5mg bid, prothrombin gene mutation p/w L knee pain found to have a new dvt.    DVT- continue eliquis with loading dose    Sinus pause- no further episodes.  Would continue to hold av gadiel blockers.  Pt has follow up with us in 3 weeks.    Please call us back with any further questions.     Thank you for allowing [facility] to participate in the care of this patient.  Please feel free to call with any questions or concerns.

## 2023-11-28 NOTE — DISCHARGE NOTE NURSING/CASE MANAGEMENT/SOCIAL WORK - NSDCPEFALRISK_GEN_ALL_CORE
For information on Fall & Injury Prevention, visit: [email or web address]  [email or web address]  https://www.cdc.gov/steadi/patient.html

## 2023-11-29 ENCOUNTER — NON-APPOINTMENT (OUTPATIENT)
Age: 78
End: 2023-11-29

## 2023-12-04 PROBLEM — I82.409 ACUTE EMBOLISM AND THROMBOSIS OF UNSPECIFIED DEEP VEINS OF UNSPECIFIED LOWER EXTREMITY: Chronic | Status: ACTIVE | Noted: 2023-11-24

## 2023-12-04 PROBLEM — E78.5 HYPERLIPIDEMIA, UNSPECIFIED: Chronic | Status: ACTIVE | Noted: 2023-11-24

## 2023-12-04 RX ORDER — APIXABAN 2.5 MG/1
1 TABLET, FILM COATED ORAL
Qty: 60 | Refills: 0
Start: 2023-12-04 | End: 2024-01-02

## 2023-12-11 ENCOUNTER — APPOINTMENT (OUTPATIENT)
Dept: INTERNAL MEDICINE | Facility: CLINIC | Age: 78
End: 2023-12-11
Payer: MEDICARE

## 2023-12-11 VITALS
HEART RATE: 90 BPM | BODY MASS INDEX: 28.2 KG/M2 | DIASTOLIC BLOOD PRESSURE: 22 MMHG | SYSTOLIC BLOOD PRESSURE: 130 MMHG | OXYGEN SATURATION: 93 % | RESPIRATION RATE: 14 BRPM | HEIGHT: 70 IN | WEIGHT: 197 LBS

## 2023-12-11 DIAGNOSIS — R79.89 OTHER SPECIFIED ABNORMAL FINDINGS OF BLOOD CHEMISTRY: ICD-10-CM

## 2023-12-11 DIAGNOSIS — Z09 ENCOUNTER FOR FOLLOW-UP EXAMINATION AFTER COMPLETED TREATMENT FOR CONDITIONS OTHER THAN MALIGNANT NEOPLASM: ICD-10-CM

## 2023-12-11 PROCEDURE — 36415 COLL VENOUS BLD VENIPUNCTURE: CPT

## 2023-12-11 PROCEDURE — 99495 TRANSJ CARE MGMT MOD F2F 14D: CPT | Mod: 25

## 2023-12-11 RX ORDER — LABETALOL HYDROCHLORIDE 100 MG/1
100 TABLET, FILM COATED ORAL
Qty: 180 | Refills: 1 | Status: DISCONTINUED | COMMUNITY
Start: 2022-03-01 | End: 2023-12-11

## 2023-12-11 RX ORDER — APIXABAN 5 MG/1
5 TABLET, FILM COATED ORAL
Qty: 180 | Refills: 1 | Status: ACTIVE | COMMUNITY
Start: 2017-06-23

## 2023-12-12 LAB
ANION GAP SERPL CALC-SCNC: 14 MMOL/L
BASOPHILS # BLD AUTO: 0.06 K/UL
BASOPHILS NFR BLD AUTO: 0.8 %
BUN SERPL-MCNC: 25 MG/DL
CALCIUM SERPL-MCNC: 9.8 MG/DL
CHLORIDE SERPL-SCNC: 100 MMOL/L
CO2 SERPL-SCNC: 25 MMOL/L
CREAT SERPL-MCNC: 1.2 MG/DL
EGFR: 62 ML/MIN/1.73M2
EOSINOPHIL # BLD AUTO: 0.17 K/UL
EOSINOPHIL NFR BLD AUTO: 2.4 %
GLUCOSE SERPL-MCNC: 116 MG/DL
HCT VFR BLD CALC: 40.3 %
HGB BLD-MCNC: 13.5 G/DL
IMM GRANULOCYTES NFR BLD AUTO: 0.3 %
LYMPHOCYTES # BLD AUTO: 1.49 K/UL
LYMPHOCYTES NFR BLD AUTO: 21.1 %
MAN DIFF?: NORMAL
MCHC RBC-ENTMCNC: 31 PG
MCHC RBC-ENTMCNC: 33.5 GM/DL
MCV RBC AUTO: 92.4 FL
MONOCYTES # BLD AUTO: 0.77 K/UL
MONOCYTES NFR BLD AUTO: 10.9 %
NEUTROPHILS # BLD AUTO: 4.56 K/UL
NEUTROPHILS NFR BLD AUTO: 64.5 %
PLATELET # BLD AUTO: 239 K/UL
POTASSIUM SERPL-SCNC: 4.1 MMOL/L
RBC # BLD: 4.36 M/UL
RBC # FLD: 13.2 %
SODIUM SERPL-SCNC: 139 MMOL/L
T4 FREE SERPL-MCNC: 1.9 NG/DL
TSH SERPL-ACNC: 0.68 UIU/ML
WBC # FLD AUTO: 7.07 K/UL

## 2023-12-13 PROCEDURE — 85025 COMPLETE CBC W/AUTO DIFF WBC: CPT

## 2023-12-13 PROCEDURE — 96375 TX/PRO/DX INJ NEW DRUG ADDON: CPT

## 2023-12-13 PROCEDURE — 80053 COMPREHEN METABOLIC PANEL: CPT

## 2023-12-13 PROCEDURE — 36415 COLL VENOUS BLD VENIPUNCTURE: CPT

## 2023-12-13 PROCEDURE — 99285 EMERGENCY DEPT VISIT HI MDM: CPT | Mod: 25

## 2023-12-13 PROCEDURE — 86147 CARDIOLIPIN ANTIBODY EA IG: CPT

## 2023-12-13 PROCEDURE — 85613 RUSSELL VIPER VENOM DILUTED: CPT

## 2023-12-13 PROCEDURE — 80048 BASIC METABOLIC PNL TOTAL CA: CPT

## 2023-12-13 PROCEDURE — 86803 HEPATITIS C AB TEST: CPT

## 2023-12-13 PROCEDURE — 93971 EXTREMITY STUDY: CPT

## 2023-12-13 PROCEDURE — 85730 THROMBOPLASTIN TIME PARTIAL: CPT

## 2023-12-13 PROCEDURE — 86146 BETA-2 GLYCOPROTEIN ANTIBODY: CPT

## 2023-12-13 PROCEDURE — 85610 PROTHROMBIN TIME: CPT

## 2023-12-13 PROCEDURE — 93005 ELECTROCARDIOGRAM TRACING: CPT

## 2023-12-13 PROCEDURE — 84100 ASSAY OF PHOSPHORUS: CPT

## 2023-12-13 PROCEDURE — 84443 ASSAY THYROID STIM HORMONE: CPT

## 2023-12-13 PROCEDURE — 85027 COMPLETE CBC AUTOMATED: CPT

## 2023-12-13 PROCEDURE — 85732 THROMBOPLASTIN TIME PARTIAL: CPT

## 2023-12-13 PROCEDURE — 96374 THER/PROPH/DIAG INJ IV PUSH: CPT

## 2023-12-13 PROCEDURE — 84484 ASSAY OF TROPONIN QUANT: CPT

## 2023-12-13 PROCEDURE — 73564 X-RAY EXAM KNEE 4 OR MORE: CPT

## 2023-12-13 PROCEDURE — 83735 ASSAY OF MAGNESIUM: CPT

## 2023-12-30 ENCOUNTER — NON-APPOINTMENT (OUTPATIENT)
Age: 78
End: 2023-12-30

## 2024-01-04 ENCOUNTER — APPOINTMENT (OUTPATIENT)
Dept: INTERNAL MEDICINE | Facility: CLINIC | Age: 79
End: 2024-01-04
Payer: MEDICARE

## 2024-01-04 VITALS
OXYGEN SATURATION: 97 % | SYSTOLIC BLOOD PRESSURE: 128 MMHG | BODY MASS INDEX: 28.06 KG/M2 | DIASTOLIC BLOOD PRESSURE: 60 MMHG | HEIGHT: 70 IN | WEIGHT: 196 LBS | HEART RATE: 72 BPM | RESPIRATION RATE: 15 BRPM

## 2024-01-04 DIAGNOSIS — Z00.00 ENCOUNTER FOR GENERAL ADULT MEDICAL EXAMINATION W/OUT ABNORMAL FINDINGS: ICD-10-CM

## 2024-01-04 DIAGNOSIS — R31.29 OTHER MICROSCOPIC HEMATURIA: ICD-10-CM

## 2024-01-04 DIAGNOSIS — N18.30 CHRONIC KIDNEY DISEASE, STAGE 3 UNSPECIFIED: ICD-10-CM

## 2024-01-04 DIAGNOSIS — I25.10 ATHEROSCLEROTIC HEART DISEASE OF NATIVE CORONARY ARTERY W/OUT ANGINA PECTORIS: ICD-10-CM

## 2024-01-04 PROCEDURE — G0439: CPT

## 2024-01-04 PROCEDURE — 36415 COLL VENOUS BLD VENIPUNCTURE: CPT

## 2024-01-06 LAB
ALBUMIN SERPL ELPH-MCNC: 4.6 G/DL
ALP BLD-CCNC: 77 U/L
ALT SERPL-CCNC: 23 U/L
ANION GAP SERPL CALC-SCNC: 14 MMOL/L
APPEARANCE: CLEAR
AST SERPL-CCNC: 17 U/L
BACTERIA: NEGATIVE /HPF
BASOPHILS # BLD AUTO: 0.05 K/UL
BASOPHILS NFR BLD AUTO: 0.5 %
BILIRUB SERPL-MCNC: 0.6 MG/DL
BILIRUBIN URINE: NEGATIVE
BLOOD URINE: NEGATIVE
BUN SERPL-MCNC: 23 MG/DL
CALCIUM SERPL-MCNC: 9.8 MG/DL
CAST: 0 /LPF
CHLORIDE SERPL-SCNC: 98 MMOL/L
CHOLEST SERPL-MCNC: 139 MG/DL
CO2 SERPL-SCNC: 26 MMOL/L
COLOR: YELLOW
CREAT SERPL-MCNC: 1.19 MG/DL
EGFR: 63 ML/MIN/1.73M2
EOSINOPHIL # BLD AUTO: 0.13 K/UL
EOSINOPHIL NFR BLD AUTO: 1.2 %
EPITHELIAL CELLS: 0 /HPF
ESTIMATED AVERAGE GLUCOSE: 128 MG/DL
GLUCOSE QUALITATIVE U: NEGATIVE MG/DL
GLUCOSE SERPL-MCNC: 104 MG/DL
HBA1C MFR BLD HPLC: 6.1 %
HCT VFR BLD CALC: 44.2 %
HDLC SERPL-MCNC: 45 MG/DL
HGB BLD-MCNC: 14.3 G/DL
IMM GRANULOCYTES NFR BLD AUTO: 0.3 %
KETONES URINE: NEGATIVE MG/DL
LDLC SERPL CALC-MCNC: 76 MG/DL
LEUKOCYTE ESTERASE URINE: NEGATIVE
LYMPHOCYTES # BLD AUTO: 1.7 K/UL
LYMPHOCYTES NFR BLD AUTO: 15.3 %
MAGNESIUM SERPL-MCNC: 2.1 MG/DL
MAN DIFF?: NORMAL
MCHC RBC-ENTMCNC: 30.6 PG
MCHC RBC-ENTMCNC: 32.4 GM/DL
MCV RBC AUTO: 94.4 FL
MICROSCOPIC-UA: NORMAL
MONOCYTES # BLD AUTO: 0.89 K/UL
MONOCYTES NFR BLD AUTO: 8 %
NEUTROPHILS # BLD AUTO: 8.29 K/UL
NEUTROPHILS NFR BLD AUTO: 74.7 %
NITRITE URINE: NEGATIVE
NONHDLC SERPL-MCNC: 94 MG/DL
PH URINE: 6.5
PLATELET # BLD AUTO: 257 K/UL
POTASSIUM SERPL-SCNC: 4.1 MMOL/L
PROT SERPL-MCNC: 7.3 G/DL
PROTEIN URINE: NEGATIVE MG/DL
RBC # BLD: 4.68 M/UL
RBC # FLD: 13.3 %
RED BLOOD CELLS URINE: 0 /HPF
SODIUM SERPL-SCNC: 139 MMOL/L
SPECIFIC GRAVITY URINE: 1.01
T4 FREE SERPL-MCNC: 1.4 NG/DL
TRIGL SERPL-MCNC: 94 MG/DL
TSH SERPL-ACNC: 1.09 UIU/ML
UROBILINOGEN URINE: 0.2 MG/DL
WBC # FLD AUTO: 11.09 K/UL
WHITE BLOOD CELLS URINE: 0 /HPF

## 2024-01-06 NOTE — ADDENDUM
[FreeTextEntry1] : Labs good other than sugar control continues in prediabetic range but improved therefore diet and exercise.   Mildly increased white blood cell count therefore repeat in 3 weeks

## 2024-01-06 NOTE — HEALTH RISK ASSESSMENT
[Very Good] : ~his/her~ current health as very good [Excellent] : ~his/her~  mood as  excellent [Yes] : Yes [2 - 4 times a month (2 pts)] : 2-4 times a month (2 points) [1 or 2 (0 pts)] : 1 or 2 (0 points) [Never (0 pts)] : Never (0 points) [No] : In the past 12 months have you used drugs other than those required for medical reasons? No [No falls in past year] : Patient reported no falls in the past year [0] : 2) Feeling down, depressed, or hopeless: Not at all (0) [PHQ-2 Negative - No further assessment needed] : PHQ-2 Negative - No further assessment needed [None] : None [With Significant Other] : lives with significant other [# of Members in Household ___] :  household currently consist of [unfilled] member(s) [Retired] : retired [College] : College [] :  [# Of Children ___] : has [unfilled] children [Sexually Active] : sexually active [Feels Safe at Home] : Feels safe at home [Fully functional (bathing, dressing, toileting, transferring, walking, feeding)] : Fully functional (bathing, dressing, toileting, transferring, walking, feeding) [Fully functional (using the telephone, shopping, preparing meals, housekeeping, doing laundry, using] : Fully functional and needs no help or supervision to perform IADLs (using the telephone, shopping, preparing meals, housekeeping, doing laundry, using transportation, managing medications and managing finances) [Smoke Detector] : smoke detector [Carbon Monoxide Detector] : carbon monoxide detector [Guns at Home] : guns at home [Seat Belt] :  uses seat belt [Sunscreen] : uses sunscreen [Reviewed no changes] : Reviewed, no changes [Never] : Never [Discussed at today's visit] : Advance Directives Discussed at today's visit [Designated Healthcare Proxy] : Designated healthcare proxy [Name: ___] : Health Care Proxy's Name: [unfilled]  [de-identified] : exercises [Audit-CScore] : 2 [de-identified] : good [UEE0Bpfhd] : 0 [Change in mental status noted] : No change in mental status noted [Reports changes in hearing] : Reports no changes in hearing [Reports changes in dental health] : Reports no changes in dental health [Reports changes in vision] : Reports no changes in vision [FreeTextEntry2] :  [de-identified] : locked and safe [AdvancecareDate] : 01/24

## 2024-01-06 NOTE — COUNSELING
[None] : None [Good understanding] : Patient has a good understanding of lifestyle changes and the steps needed to achieve self management goals [de-identified] : Continue diet and exercise

## 2024-01-06 NOTE — HISTORY OF PRESENT ILLNESS
[FreeTextEntry1] : 78-year-old male with history of CAD seen on CTA as well as hypertension on valsartan and metoprolol and hyperlipidemia on Livalo presents for his yearly physical.  Patient also with history of hypothyroidism on thyroid replacement therapy.   2017 he had an acute leg DVT with associated pulmonary emboli. This occurred after the patient had an ankle injury. Otherwise there was no preceding incident to cause his DVT. The patient has been on Eliquis since. He did see hematology with a negative hypercoagulable workup. Hematology recommended 3 months of anticoagulation. Also being followed by vascular who felt the patient should be on anticoagulation for 6 months. .The patient continues on Eliquis on the recommendation of cardiology secondary to his DVT being essentially unprovoked and the patient's brother having had a history of multiple clots. Therefore, concern for some familial hypercoagulability, despite negative hypercoagulable workup. He did follow-up with hematology secondary to his son diagnosed with a massive PE requiring surgical intervention with work-up finding and heterozygous for the prothrombin gene mutation. Therefore the patient had blood work done and he is also heterozygous for prothrombin gene mutation.  Therefore lifelong anticoagulation. Patient was continued on Eliquis 2.5 mg twice daily but had hospitalization 2020 for with another episode of DVT therefore now back on Eliquis 5 mg twice daily which will be lifelong.  Left leg pain improved.  Patient generally feeling well without chest pain, palpitations or shortness of breath. He follows with cardiology every 6 months. Stress echocardiogram 2022 normal with mild MR/TR/MI Coronary calcium score of  = 331 therefore patient on aspirin daily. Stress test 2023 negative with PACs and PVCs Carotid duplex negative  He sees urology once a year.  On direct questioning patient without any complaints. The patient has made some good lifestyle changes mainly with dietary changes.  He is  with 2 children and has a retired [employer]  and continues to work at [employer]  [employer] where he has worked for over 50 years.

## 2024-01-06 NOTE — ASSESSMENT
[FreeTextEntry1] : 77-year-old male with known CAD via CTA remains asymptomatic with controlled hypertension and hyperlipidemia on present medications.Patient is clinically euthyroid on thyroid replacement therapy.  The patient is status post DVT with PE June 2017 and on lower dose Eliquis 2.5 mg BID since on recommendation of cardiology despite negative hypercoagulable workup. More recent hypercoagulable work-up this year shows patient to be heterozygous for the prothrombin gene mutation therefore lifelong anticoagulation. Patient with another episode of DVT November 2023 while on Eliquis 2.5 mg twice daily therefore increased to 5 mg twice daily which will be lifelong.  Coronary plaques seen on coronary calcium CAT scan therefore patient on aspirin and continued statin therapy  To continue present medications Continue to diet and exercise encouraged  Followup with specialists including cardiology and urology as scheduled Colonoscopy October 2018 with followup in 5 years  Influenza and COVID vaccines already received Up to date with all of the vaccines  venipuncture done at today's office visit  Followup in 3-4 months.

## 2024-01-06 NOTE — PHYSICAL EXAM
[General Appearance - Alert] : alert [General Appearance - In No Acute Distress] : in no acute distress [Sclera] : the sclera and conjunctiva were normal [PERRL With Normal Accommodation] : pupils were equal in size, round, and reactive to light [Extraocular Movements] : extraocular movements were intact [Outer Ear] : the ears and nose were normal in appearance [Oropharynx] : the oropharynx was normal [Neck Appearance] : the appearance of the neck was normal [Neck Cervical Mass (___cm)] : no neck mass was observed [Jugular Venous Distention Increased] : there was no jugular-venous distention [Thyroid Diffuse Enlargement] : the thyroid was not enlarged [Thyroid Nodule] : there were no palpable thyroid nodules [Auscultation Breath Sounds / Voice Sounds] : lungs were clear to auscultation bilaterally [Heart Rate And Rhythm] : heart rate was normal and rhythm regular [Heart Sounds Gallop] : no gallops [Heart Sounds] : normal S1 and S2 [Murmurs] : no murmurs [Heart Sounds Pericardial Friction Rub] : no pericardial rub [Arterial Pulses Carotid] : carotid pulses were normal with no bruits [Abdominal Aorta] : the abdominal aorta was normal [Arterial Pulses Femoral] : femoral pulses were normal without bruits [Full Pulse] : the pedal pulses are present [Edema] : there was no peripheral edema [Veins - Varicosity Changes] : there were no varicosital changes [Bowel Sounds] : normal bowel sounds [Abdomen Soft] : soft [Abdomen Tenderness] : non-tender [Abdomen Mass (___ Cm)] : no abdominal mass palpated [Cervical Lymph Nodes Enlarged Posterior Bilaterally] : posterior cervical [Cervical Lymph Nodes Enlarged Anterior Bilaterally] : anterior cervical [Supraclavicular Lymph Nodes Enlarged Bilaterally] : supraclavicular [Axillary Lymph Nodes Enlarged Bilaterally] : axillary [Femoral Lymph Nodes Enlarged Bilaterally] : femoral [Inguinal Lymph Nodes Enlarged Bilaterally] : inguinal [No Spinal Tenderness] : no spinal tenderness [Abnormal Walk] : normal gait [Nail Clubbing] : no clubbing  or cyanosis of the fingernails [Musculoskeletal - Swelling] : no joint swelling seen [Motor Tone] : muscle strength and tone were normal [Skin Color & Pigmentation] : normal skin color and pigmentation [Skin Turgor] : normal skin turgor [] : no rash [Cranial Nerves] : cranial nerves 2-12 were intact [No Focal Deficits] : no focal deficits [Oriented To Time, Place, And Person] : oriented to person, place, and time [Impaired Insight] : insight and judgment were intact [Affect] : the affect was normal [FreeTextEntry1] : via urology

## 2024-01-17 ENCOUNTER — OUTPATIENT (OUTPATIENT)
Dept: OUTPATIENT SERVICES | Facility: HOSPITAL | Age: 79
LOS: 1 days | Discharge: HOME | End: 2024-01-17

## 2024-01-17 DIAGNOSIS — I82.412 ACUTE EMBOLISM AND THROMBOSIS OF LEFT FEMORAL VEIN: ICD-10-CM

## 2024-01-18 ENCOUNTER — APPOINTMENT (OUTPATIENT)
Dept: HEMATOLOGY ONCOLOGY | Facility: CLINIC | Age: 79
End: 2024-01-18
Payer: MEDICARE

## 2024-01-18 VITALS
SYSTOLIC BLOOD PRESSURE: 132 MMHG | BODY MASS INDEX: 28.35 KG/M2 | DIASTOLIC BLOOD PRESSURE: 80 MMHG | HEIGHT: 70 IN | TEMPERATURE: 98 F | HEART RATE: 64 BPM | WEIGHT: 198 LBS | OXYGEN SATURATION: 96 %

## 2024-01-18 DIAGNOSIS — D68.52 PROTHROMBIN GENE MUTATION: ICD-10-CM

## 2024-01-18 DIAGNOSIS — I82.4Y2 ACUTE EMBOLISM AND THROMBOSIS OF UNSPECIFIED DEEP VEINS OF LEFT PROXIMAL LOWER EXTREMITY: ICD-10-CM

## 2024-01-18 PROCEDURE — 99214 OFFICE O/P EST MOD 30 MIN: CPT

## 2024-01-18 NOTE — ADDENDUM
[FreeTextEntry1] : Documented by Carlene Singleton acting as scribe for Dr. Liu on  01/18/2024.    All Medical record entries made by the Scribe were at my, Dr. Liu's, direction and personally dictated by me on  01/18/2024. I have reviewed the chart and agree that the record accurately reflects my personal performance of the history, physical exam, assessment and plan. I have also personally directed, reviewed, and agreed with the discharge instructions.

## 2024-01-18 NOTE — ASSESSMENT
[FreeTextEntry1] : 76 year old gentleman with h/o DVT and bilateral pulmonary emboli in 6/2017.  He was on Eliquis 5 mg BID for several months and then decreased to 2.5 mg BID for prophylaxis.  His son was recently diagnosed with DVT/PE s/p plane travel and was found to be heterozygous for PT gene mutation. Patient also subsequently tested positive for PT gene mutation. Diagnosed with 2nd episode of LLE DVT - femoral vein in 11/24/23, currently on Eliquis 5 mg BID.  Reviewed: 1/4/24 CBC:  WBC 11.09K, HGB 14.3g, HCT 44.2%, PLT 257K, ANC 8290   Plan: Continue Eliquis 5 mg BID. Plan for indefinite anticoagulation. RTO PRN

## 2024-01-18 NOTE — HISTORY OF PRESENT ILLNESS
[de-identified] : Mr. Henson is a 72 year old gentleman being followed for h/o DVT/PE.  He recalls twisting his ankle on 6/15/17 but denies any prolong immobility after the incident. He began to noticed left ankle/left leg swelling a few days after.  He denies any prolonged travel.  Denies any recent surgery.  He sought medical attention as the swelling and pain in left leg got worse. US doppler on 6/22/17 confirmed DVT of left common femoral to proximal calf veins.  He was started on Eliquis.  He followed up with Dr. Fairbanks and eventually had a CT chest/abd/pelvis on 6/26/17 to rule out malignancy.  Scans showed pulmonary emboli in right upper and lower lobar segmental branches, and no evidence of malignancy. His brother had blood clot in his 60s reportedly after aneurysm repair. No history of strokes in the family.    [de-identified] : Returns for follow up visit.  Patient was on eliquis 2.5 mg BID. Now on 5 mg BID He subsequently was hospitalized for at [facility] from 11/24 - 11/28/23 for acute LLE DVT. He was started on heparin gtt and transitioned to therapeutic dose eliquis. Denies COVID or recent infection around the time of hospitalization  Denies bleeding, blood in urine/stool, black stool

## 2024-01-18 NOTE — CONSULT LETTER
[Dear  ___] : Dear  [unfilled], [Consult Letter:] : I had the pleasure of evaluating your patient, [unfilled]. [Please see my note below.] : Please see my note below. [Consult Closing:] : Thank you very much for allowing me to participate in the care of this patient.  If you have any questions, please do not hesitate to contact me. [Sincerely,] : Sincerely, [DrNany  ___] : Dr. BARRY [FreeTextEntry3] : Silke Liu MD\par  Medical Oncology/Hematology\par  [facility]\par  [facility] [place]

## 2024-01-22 ENCOUNTER — RX RENEWAL (OUTPATIENT)
Age: 79
End: 2024-01-22

## 2024-01-22 RX ORDER — PITAVASTATIN CALCIUM 4 MG/1
4 TABLET ORAL
Qty: 90 | Refills: 1 | Status: ACTIVE | COMMUNITY
Start: 2024-01-22

## 2024-01-31 ENCOUNTER — APPOINTMENT (OUTPATIENT)
Dept: INTERNAL MEDICINE | Facility: CLINIC | Age: 79
End: 2024-01-31
Payer: MEDICARE

## 2024-01-31 VITALS
SYSTOLIC BLOOD PRESSURE: 125 MMHG | BODY MASS INDEX: 28.2 KG/M2 | HEIGHT: 70 IN | RESPIRATION RATE: 12 BRPM | WEIGHT: 197 LBS | HEART RATE: 72 BPM | DIASTOLIC BLOOD PRESSURE: 75 MMHG | OXYGEN SATURATION: 98 %

## 2024-01-31 DIAGNOSIS — D72.829 ELEVATED WHITE BLOOD CELL COUNT, UNSPECIFIED: ICD-10-CM

## 2024-01-31 PROCEDURE — 99213 OFFICE O/P EST LOW 20 MIN: CPT

## 2024-01-31 PROCEDURE — 36415 COLL VENOUS BLD VENIPUNCTURE: CPT

## 2024-01-31 PROCEDURE — G2211 COMPLEX E/M VISIT ADD ON: CPT

## 2024-01-31 NOTE — DATA REVIEWED
[FreeTextEntry1] : Labs as above [Derm Symptoms] : DERM SYMPTOMS [Rash] : rash [Face] : face [___ Week(s)] : [unfilled] week(s) [Intermittent] : intermittent [Erythematous] : erythematous [Itchy] : itchy [Spreading] : spreading [Sore Throat] : sore throat [New Food] : no new food [New Skin Products] : no new skin products [Fever] : no fever [FreeTextEntry5] : sore throat and  nasal congestion x 1 day  [de-identified] : has tried Aquaphor once \par had COVID Jan 2022

## 2024-01-31 NOTE — HISTORY OF PRESENT ILLNESS
[FreeTextEntry1] : Patient presents for followup Patient is currently on Diovan HCT for hypertension -Last blood work showed WBC elevated 11.09, abnormal results discussed with patient and questions answered

## 2024-01-31 NOTE — ASSESSMENT
[FreeTextEntry1] : Venipuncture done in our office, Labs sent out/ further recommendations will be made based on lab results. Patient advised to continue present medications with diet/exercise and specialist followup. Patient will return to the office as scheduled for regular follow-up  colonoscopy was 10/2018-next in 5 years "to do" vaccines are UTD, +got covid vaccines specialists include 1. cardiology-Dr. Matias 2. ophthalmology-Site MD 3. urology yearly-Dr. Lowe 4. podiatry on a p.r.n. basis-Dr. Mercado/Dr. Kim 5.hematology-Dr. Liu 6. Vascular-Dr. Lennon-no need for follow up HIV screen=4/2018 Hepatitis C screening=4/2018 Echo stress test 4/2022 carotid sono 6/2023=no stenosis.

## 2024-02-01 LAB
BASOPHILS # BLD AUTO: 0.05 K/UL
BASOPHILS NFR BLD AUTO: 0.6 %
EOSINOPHIL # BLD AUTO: 0.29 K/UL
EOSINOPHIL NFR BLD AUTO: 3.6 %
HCT VFR BLD CALC: 41.3 %
HGB BLD-MCNC: 13.3 G/DL
IMM GRANULOCYTES NFR BLD AUTO: 0.4 %
LYMPHOCYTES # BLD AUTO: 1.79 K/UL
LYMPHOCYTES NFR BLD AUTO: 22.1 %
MAN DIFF?: NORMAL
MCHC RBC-ENTMCNC: 30 PG
MCHC RBC-ENTMCNC: 32.2 GM/DL
MCV RBC AUTO: 93.2 FL
MONOCYTES # BLD AUTO: 0.79 K/UL
MONOCYTES NFR BLD AUTO: 9.8 %
NEUTROPHILS # BLD AUTO: 5.14 K/UL
NEUTROPHILS NFR BLD AUTO: 63.5 %
PLATELET # BLD AUTO: 227 K/UL
RBC # BLD: 4.43 M/UL
RBC # FLD: 13.5 %
WBC # FLD AUTO: 8.09 K/UL

## 2024-04-08 ENCOUNTER — RX RENEWAL (OUTPATIENT)
Age: 79
End: 2024-04-08

## 2024-04-08 RX ORDER — LEVOTHYROXINE SODIUM 0.15 MG/1
150 TABLET ORAL
Qty: 90 | Refills: 1 | Status: ACTIVE | COMMUNITY
Start: 2023-11-01 | End: 1900-01-01

## 2024-04-29 ENCOUNTER — APPOINTMENT (OUTPATIENT)
Dept: INTERNAL MEDICINE | Facility: CLINIC | Age: 79
End: 2024-04-29
Payer: MEDICARE

## 2024-04-29 VITALS
HEART RATE: 71 BPM | DIASTOLIC BLOOD PRESSURE: 80 MMHG | SYSTOLIC BLOOD PRESSURE: 122 MMHG | BODY MASS INDEX: 28.35 KG/M2 | OXYGEN SATURATION: 98 % | WEIGHT: 198 LBS | HEIGHT: 70 IN | RESPIRATION RATE: 12 BRPM

## 2024-04-29 DIAGNOSIS — M79.10 MYALGIA, UNSPECIFIED SITE: ICD-10-CM

## 2024-04-29 DIAGNOSIS — R73.01 IMPAIRED FASTING GLUCOSE: ICD-10-CM

## 2024-04-29 DIAGNOSIS — Z79.899 OTHER LONG TERM (CURRENT) DRUG THERAPY: ICD-10-CM

## 2024-04-29 DIAGNOSIS — I10 ESSENTIAL (PRIMARY) HYPERTENSION: ICD-10-CM

## 2024-04-29 DIAGNOSIS — E03.9 HYPOTHYROIDISM, UNSPECIFIED: ICD-10-CM

## 2024-04-29 DIAGNOSIS — E78.5 HYPERLIPIDEMIA, UNSPECIFIED: ICD-10-CM

## 2024-04-29 PROCEDURE — 36415 COLL VENOUS BLD VENIPUNCTURE: CPT

## 2024-04-29 PROCEDURE — 99214 OFFICE O/P EST MOD 30 MIN: CPT

## 2024-04-29 PROCEDURE — G2211 COMPLEX E/M VISIT ADD ON: CPT

## 2024-04-29 NOTE — HISTORY OF PRESENT ILLNESS
[FreeTextEntry1] : Patient presents for followup on hypertension/hyperlipidemia/hypothyroid. Patient is currently fasting for today's labs. Patient is currently on Diovan HCT for hypertension, on Livalo for hyperlipidemia and is on Synthroid for his hypothyroidism. -got covid vaccines  -Patient scheduled to have Mohs procedure done by dermatology, unsure what type of skin cancer -Patient states he has had aches and pains with statin in the past and has been on livalo since 2020 and continues with aches and pains.  Patient mentioned it to cardiology who suggested he take it every other day and he is on co-Q10 but it has not made much difference

## 2024-04-29 NOTE — ASSESSMENT
[FreeTextEntry1] : CPK to be checked, told patient to speak to cardiology for possible injectable medication.  Venipuncture done in our office, Labs sent out/ further recommendations will be made based on lab results. Patient advised to continue present medications with diet/exercise and specialist followup. Patient will return to the office in 4months  Dr. Fairbanks was present in office building while I examined patient  colonoscopy was 10/2018-next in 5 years "to do", FIT test given vaccines are UTD, +got covid vaccines specialists include 1. cardiology-Dr. Matias 2. ophthalmology-Site MD 3. urology yearly-Dr. Lowe 4. podiatry on a p.r.n. basis-Dr. Mercado/Dr. Kim 5.hematology-Dr. Liu 6. Vascular-Dr. Lennon-no need for follow up 7.Derm=Dr. Herrera HIV screen=4/2018 Hepatitis C screening=4/2018 Echo stress test 4/2022 carotid sono 6/2023=no stenosis.

## 2024-04-30 LAB
ALBUMIN SERPL ELPH-MCNC: 4.6 G/DL
ALP BLD-CCNC: 67 U/L
ALT SERPL-CCNC: 20 U/L
ANION GAP SERPL CALC-SCNC: 14 MMOL/L
AST SERPL-CCNC: 16 U/L
BASOPHILS # BLD AUTO: 0.05 K/UL
BASOPHILS NFR BLD AUTO: 0.8 %
BILIRUB SERPL-MCNC: 0.3 MG/DL
BUN SERPL-MCNC: 18 MG/DL
CALCIUM SERPL-MCNC: 9.4 MG/DL
CHLORIDE SERPL-SCNC: 103 MMOL/L
CHOLEST SERPL-MCNC: 140 MG/DL
CK SERPL-CCNC: 179 U/L
CO2 SERPL-SCNC: 22 MMOL/L
CREAT SERPL-MCNC: 1.19 MG/DL
EGFR: 63 ML/MIN/1.73M2
EOSINOPHIL # BLD AUTO: 0.14 K/UL
EOSINOPHIL NFR BLD AUTO: 2.1 %
ESTIMATED AVERAGE GLUCOSE: 137 MG/DL
GLUCOSE SERPL-MCNC: 105 MG/DL
HBA1C MFR BLD HPLC: 6.4 %
HCT VFR BLD CALC: 43.1 %
HDLC SERPL-MCNC: 51 MG/DL
HGB BLD-MCNC: 13.8 G/DL
IMM GRANULOCYTES NFR BLD AUTO: 0.3 %
LDLC SERPL CALC-MCNC: 72 MG/DL
LYMPHOCYTES # BLD AUTO: 1.57 K/UL
LYMPHOCYTES NFR BLD AUTO: 24 %
MAN DIFF?: NORMAL
MCHC RBC-ENTMCNC: 30.3 PG
MCHC RBC-ENTMCNC: 32 GM/DL
MCV RBC AUTO: 94.7 FL
MONOCYTES # BLD AUTO: 0.64 K/UL
MONOCYTES NFR BLD AUTO: 9.8 %
NEUTROPHILS # BLD AUTO: 4.12 K/UL
NEUTROPHILS NFR BLD AUTO: 63 %
NONHDLC SERPL-MCNC: 89 MG/DL
PLATELET # BLD AUTO: 212 K/UL
POTASSIUM SERPL-SCNC: 4.2 MMOL/L
PROT SERPL-MCNC: 7.1 G/DL
RBC # BLD: 4.55 M/UL
RBC # FLD: 14.6 %
SODIUM SERPL-SCNC: 139 MMOL/L
TRIGL SERPL-MCNC: 91 MG/DL
TSH SERPL-ACNC: 1.67 UIU/ML
WBC # FLD AUTO: 6.54 K/UL

## 2024-05-11 LAB — HEMOCCULT STL QL IA: POSITIVE

## 2024-05-20 ENCOUNTER — OFFICE (OUTPATIENT)
Dept: URBAN - METROPOLITAN AREA CLINIC 115 | Facility: CLINIC | Age: 79
Setting detail: OPHTHALMOLOGY
End: 2024-05-20
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
## 2024-05-20 PROCEDURE — NO SHOW FE NO SHOW FEE: Performed by: OPHTHALMOLOGY

## 2024-05-29 ENCOUNTER — NON-APPOINTMENT (OUTPATIENT)
Age: 79
End: 2024-05-29

## 2024-06-07 NOTE — ED ADULT NURSE NOTE - DRUG PRE-SCREENING (DAST -1)
cetirizine (ZYRTEC) 10 MG tablet 30 tablet 0 3/18/2024       Last Office Visit: 9/22/22  Future Office visit:   NONE    REFILL DENIED  MESSAGE SENT WITH LAST [facility] REFILL TO SCHEDULE APPT    Bessy Blunt RN  [facility] Red Flag Triage/MRT            Statement Selected

## 2024-06-26 ENCOUNTER — NON-APPOINTMENT (OUTPATIENT)
Age: 79
End: 2024-06-26

## 2024-07-01 ENCOUNTER — OFFICE (OUTPATIENT)
Dept: URBAN - METROPOLITAN AREA CLINIC 115 | Facility: CLINIC | Age: 79
Setting detail: OPHTHALMOLOGY
End: 2024-07-01
Payer: MEDICARE

## 2024-07-01 DIAGNOSIS — H43.391: ICD-10-CM

## 2024-07-01 DIAGNOSIS — H43.811: ICD-10-CM

## 2024-07-01 DIAGNOSIS — H01.004: ICD-10-CM

## 2024-07-01 DIAGNOSIS — Z96.1: ICD-10-CM

## 2024-07-01 DIAGNOSIS — H01.001: ICD-10-CM

## 2024-07-01 DIAGNOSIS — H11.153: ICD-10-CM

## 2024-07-01 DIAGNOSIS — H35.033: ICD-10-CM

## 2024-07-01 PROCEDURE — 92202 OPSCPY EXTND ON/MAC DRAW: CPT | Performed by: OPHTHALMOLOGY

## 2024-07-01 PROCEDURE — 92014 COMPRE OPH EXAM EST PT 1/>: CPT | Performed by: OPHTHALMOLOGY

## 2024-07-01 ASSESSMENT — LID EXAM ASSESSMENTS
OD_BLEPHARITIS: RUL T
OS_BLEPHARITIS: LUL T

## 2024-07-01 ASSESSMENT — CONFRONTATIONAL VISUAL FIELD TEST (CVF)
OS_FINDINGS: FULL
OD_FINDINGS: FULL

## 2024-07-19 ENCOUNTER — RX RENEWAL (OUTPATIENT)
Age: 79
End: 2024-07-19

## 2024-07-24 ENCOUNTER — RX RENEWAL (OUTPATIENT)
Age: 79
End: 2024-07-24

## 2024-09-06 ENCOUNTER — APPOINTMENT (OUTPATIENT)
Dept: INTERNAL MEDICINE | Facility: CLINIC | Age: 79
End: 2024-09-06
Payer: MEDICARE

## 2024-09-06 VITALS
OXYGEN SATURATION: 98 % | HEIGHT: 70 IN | DIASTOLIC BLOOD PRESSURE: 75 MMHG | WEIGHT: 196 LBS | HEART RATE: 69 BPM | RESPIRATION RATE: 14 BRPM | SYSTOLIC BLOOD PRESSURE: 125 MMHG | BODY MASS INDEX: 28.06 KG/M2

## 2024-09-06 DIAGNOSIS — Z79.899 OTHER LONG TERM (CURRENT) DRUG THERAPY: ICD-10-CM

## 2024-09-06 DIAGNOSIS — I10 ESSENTIAL (PRIMARY) HYPERTENSION: ICD-10-CM

## 2024-09-06 DIAGNOSIS — Z11.59 ENCOUNTER FOR SCREENING FOR OTHER VIRAL DISEASES: ICD-10-CM

## 2024-09-06 DIAGNOSIS — E03.9 HYPOTHYROIDISM, UNSPECIFIED: ICD-10-CM

## 2024-09-06 DIAGNOSIS — E78.5 HYPERLIPIDEMIA, UNSPECIFIED: ICD-10-CM

## 2024-09-06 DIAGNOSIS — I25.10 ATHEROSCLEROTIC HEART DISEASE OF NATIVE CORONARY ARTERY W/OUT ANGINA PECTORIS: ICD-10-CM

## 2024-09-06 DIAGNOSIS — R73.01 IMPAIRED FASTING GLUCOSE: ICD-10-CM

## 2024-09-06 PROCEDURE — 36415 COLL VENOUS BLD VENIPUNCTURE: CPT

## 2024-09-06 PROCEDURE — G2211 COMPLEX E/M VISIT ADD ON: CPT

## 2024-09-06 PROCEDURE — 99214 OFFICE O/P EST MOD 30 MIN: CPT

## 2024-09-06 RX ORDER — OMEGA-3/DHA/EPA/FISH OIL 300-1000MG
400 CAPSULE ORAL
Refills: 0 | Status: ACTIVE | COMMUNITY
Start: 2024-09-06

## 2024-09-07 LAB
ALBUMIN SERPL ELPH-MCNC: 4.6 G/DL
ALP BLD-CCNC: 71 U/L
ALT SERPL-CCNC: 28 U/L
ANION GAP SERPL CALC-SCNC: 14 MMOL/L
AST SERPL-CCNC: 25 U/L
BASOPHILS # BLD AUTO: 0.05 K/UL
BASOPHILS NFR BLD AUTO: 0.9 %
BILIRUB SERPL-MCNC: 0.5 MG/DL
BUN SERPL-MCNC: 24 MG/DL
CALCIUM SERPL-MCNC: 9.6 MG/DL
CHLORIDE SERPL-SCNC: 100 MMOL/L
CHOLEST SERPL-MCNC: 111 MG/DL
CO2 SERPL-SCNC: 25 MMOL/L
CREAT SERPL-MCNC: 1.48 MG/DL
EGFR: 48 ML/MIN/1.73M2
EOSINOPHIL # BLD AUTO: 0.05 K/UL
EOSINOPHIL NFR BLD AUTO: 0.9 %
ESTIMATED AVERAGE GLUCOSE: 128 MG/DL
GLUCOSE SERPL-MCNC: 95 MG/DL
HBA1C MFR BLD HPLC: 6.1 %
HCT VFR BLD CALC: 44.8 %
HDLC SERPL-MCNC: 48 MG/DL
HGB BLD-MCNC: 14.3 G/DL
IMM GRANULOCYTES NFR BLD AUTO: 0.2 %
INFLUENZA A RESULT: NOT DETECTED
INFLUENZA B RESULT: NOT DETECTED
LDLC SERPL CALC-MCNC: 48 MG/DL
LYMPHOCYTES # BLD AUTO: 1.31 K/UL
LYMPHOCYTES NFR BLD AUTO: 23.3 %
MAN DIFF?: NORMAL
MCHC RBC-ENTMCNC: 31.1 PG
MCHC RBC-ENTMCNC: 31.9 GM/DL
MCV RBC AUTO: 97.4 FL
MONOCYTES # BLD AUTO: 0.75 K/UL
MONOCYTES NFR BLD AUTO: 13.3 %
NEUTROPHILS # BLD AUTO: 3.45 K/UL
NEUTROPHILS NFR BLD AUTO: 61.4 %
NONHDLC SERPL-MCNC: 63 MG/DL
PLATELET # BLD AUTO: 205 K/UL
POTASSIUM SERPL-SCNC: 4.5 MMOL/L
PROT SERPL-MCNC: 7.3 G/DL
RBC # BLD: 4.6 M/UL
RBC # FLD: 14.1 %
RESP SYN VIRUS RESULT: NOT DETECTED
SARS-COV-2 RESULT: DETECTED
SODIUM SERPL-SCNC: 138 MMOL/L
TRIGL SERPL-MCNC: 78 MG/DL
TSH SERPL-ACNC: 1.78 UIU/ML
WBC # FLD AUTO: 5.62 K/UL

## 2024-09-07 NOTE — ASSESSMENT
[FreeTextEntry1] : No signs of bacterial infection on today's exam, viral swab sent per patient request.  Venipuncture done in our office, Labs sent out/ further recommendations will be made based on lab results. Patient advised to continue present medications with diet/exercise and specialist followup. Patient will return to the office in jan for CP  Attending MD available by phone if needed  colonoscopy was done = to call for report vaccines are UTD, +got covid vaccines specialists include 1. cardiology-Dr. Matias 2. ophthalmology-Site MD 3. urology yearly-Dr. Lowe 4. podiatry on a p.r.n. basis-Dr. Mercado/Dr. Kim 5.hematology-Dr. Liu 6. Vascular-Dr. Lennon-no need for follow up 7.Derm=Dr. Herrera HIV screen=4/2018 Hepatitis C screening=4/2018 Echo via cardio carotid sono 6/2023=no stenosis.

## 2024-09-07 NOTE — ADDENDUM
[FreeTextEntry1] : Labs show -covid +, supportive, will offer antiviral but sx were minimal -BUN/Creatinine of 24/1.48=repeat in 3 weeks

## 2024-09-07 NOTE — HISTORY OF PRESENT ILLNESS
[FreeTextEntry1] : Patient presents for followup on hypertension/hyperlipidemia/hypothyroid. Patient is currently fasting for today's labs. Patient is currently on Diovan HCT for hypertension, on Livalo for hyperlipidemia and is on Synthroid for his hypothyroidism. -Patient would like to be tested for COVID.  Patient states he had scratchy throat yesterday, essentially resolved today and took home COVID test which was negative but it was  so he is questioning accuracy.  Patient denies fever/cough or COVID exposure but is concerned.

## 2024-09-20 ENCOUNTER — NON-APPOINTMENT (OUTPATIENT)
Age: 79
End: 2024-09-20

## 2024-10-07 ENCOUNTER — RX RENEWAL (OUTPATIENT)
Age: 79
End: 2024-10-07

## 2024-11-26 ENCOUNTER — NON-APPOINTMENT (OUTPATIENT)
Age: 79
End: 2024-11-26

## 2025-01-06 NOTE — ED ADULT NURSE NOTE - TOBACCO SCREENING (FROM THE ASSIST)
Patient called to confirm office received Prior Authorization, she is out starting tomorrow. Please call with any questions.   Statement Selected

## 2025-01-15 ENCOUNTER — RX RENEWAL (OUTPATIENT)
Age: 80
End: 2025-01-15

## 2025-01-20 ENCOUNTER — NON-APPOINTMENT (OUTPATIENT)
Age: 80
End: 2025-01-20

## 2025-01-20 ENCOUNTER — APPOINTMENT (OUTPATIENT)
Dept: INTERNAL MEDICINE | Facility: CLINIC | Age: 80
End: 2025-01-20
Payer: MEDICARE

## 2025-01-20 VITALS
BODY MASS INDEX: 28.35 KG/M2 | WEIGHT: 198 LBS | OXYGEN SATURATION: 98 % | RESPIRATION RATE: 13 BRPM | DIASTOLIC BLOOD PRESSURE: 70 MMHG | HEART RATE: 80 BPM | SYSTOLIC BLOOD PRESSURE: 140 MMHG | HEIGHT: 70 IN

## 2025-01-20 DIAGNOSIS — N18.30 CHRONIC KIDNEY DISEASE, STAGE 3 UNSPECIFIED: ICD-10-CM

## 2025-01-20 DIAGNOSIS — I10 ESSENTIAL (PRIMARY) HYPERTENSION: ICD-10-CM

## 2025-01-20 DIAGNOSIS — E78.5 HYPERLIPIDEMIA, UNSPECIFIED: ICD-10-CM

## 2025-01-20 DIAGNOSIS — Z00.00 ENCOUNTER FOR GENERAL ADULT MEDICAL EXAMINATION W/OUT ABNORMAL FINDINGS: ICD-10-CM

## 2025-01-20 DIAGNOSIS — R73.01 IMPAIRED FASTING GLUCOSE: ICD-10-CM

## 2025-01-20 DIAGNOSIS — Z79.899 OTHER LONG TERM (CURRENT) DRUG THERAPY: ICD-10-CM

## 2025-01-20 DIAGNOSIS — E03.9 HYPOTHYROIDISM, UNSPECIFIED: ICD-10-CM

## 2025-01-20 DIAGNOSIS — I25.10 ATHEROSCLEROTIC HEART DISEASE OF NATIVE CORONARY ARTERY W/OUT ANGINA PECTORIS: ICD-10-CM

## 2025-01-20 DIAGNOSIS — D68.52 PROTHROMBIN GENE MUTATION: ICD-10-CM

## 2025-01-20 PROCEDURE — 36415 COLL VENOUS BLD VENIPUNCTURE: CPT

## 2025-01-20 PROCEDURE — G0439: CPT

## 2025-01-20 RX ORDER — KETOCONAZOLE 20 MG/G
2 CREAM TOPICAL
Qty: 60 | Refills: 0 | Status: DISCONTINUED | COMMUNITY
Start: 2025-01-08

## 2025-01-21 DIAGNOSIS — R73.03 PREDIABETES.: ICD-10-CM

## 2025-01-21 LAB
ALBUMIN SERPL ELPH-MCNC: 4.5 G/DL
ALP BLD-CCNC: 67 U/L
ALT SERPL-CCNC: 25 U/L
ANION GAP SERPL CALC-SCNC: 14 MMOL/L
APPEARANCE: CLEAR
AST SERPL-CCNC: 20 U/L
BACTERIA: NEGATIVE /HPF
BASOPHILS # BLD AUTO: 0.04 K/UL
BASOPHILS NFR BLD AUTO: 0.6 %
BILIRUB SERPL-MCNC: 0.6 MG/DL
BILIRUBIN URINE: NEGATIVE
BLOOD URINE: ABNORMAL
BUN SERPL-MCNC: 20 MG/DL
CALCIUM SERPL-MCNC: 9.8 MG/DL
CAST: 0 /LPF
CHLORIDE SERPL-SCNC: 101 MMOL/L
CHOLEST SERPL-MCNC: 140 MG/DL
CO2 SERPL-SCNC: 26 MMOL/L
COLOR: YELLOW
CREAT SERPL-MCNC: 1.25 MG/DL
EGFR: 59 ML/MIN/1.73M2
EOSINOPHIL # BLD AUTO: 0.18 K/UL
EOSINOPHIL NFR BLD AUTO: 2.8 %
EPITHELIAL CELLS: 0 /HPF
ESTIMATED AVERAGE GLUCOSE: 140 MG/DL
GLUCOSE QUALITATIVE U: NEGATIVE MG/DL
GLUCOSE SERPL-MCNC: 90 MG/DL
HBA1C MFR BLD HPLC: 6.5 %
HCT VFR BLD CALC: 42.5 %
HDLC SERPL-MCNC: 46 MG/DL
HGB BLD-MCNC: 13.8 G/DL
IMM GRANULOCYTES NFR BLD AUTO: 0.2 %
KETONES URINE: NEGATIVE MG/DL
LDLC SERPL CALC-MCNC: 75 MG/DL
LEUKOCYTE ESTERASE URINE: NEGATIVE
LYMPHOCYTES # BLD AUTO: 0.58 K/UL
LYMPHOCYTES NFR BLD AUTO: 9.1 %
MAGNESIUM SERPL-MCNC: 2.2 MG/DL
MAN DIFF?: NORMAL
MCHC RBC-ENTMCNC: 30.1 PG
MCHC RBC-ENTMCNC: 32.5 G/DL
MCV RBC AUTO: 92.6 FL
MICROSCOPIC-UA: NORMAL
MONOCYTES # BLD AUTO: 0.56 K/UL
MONOCYTES NFR BLD AUTO: 8.8 %
NEUTROPHILS # BLD AUTO: 4.99 K/UL
NEUTROPHILS NFR BLD AUTO: 78.5 %
NITRITE URINE: NEGATIVE
NONHDLC SERPL-MCNC: 94 MG/DL
PH URINE: 6.5
PLATELET # BLD AUTO: 195 K/UL
POTASSIUM SERPL-SCNC: 4.2 MMOL/L
PROT SERPL-MCNC: 7.1 G/DL
PROTEIN URINE: NEGATIVE MG/DL
RBC # BLD: 4.59 M/UL
RBC # FLD: 13.7 %
RED BLOOD CELLS URINE: 0 /HPF
SODIUM SERPL-SCNC: 141 MMOL/L
SPECIFIC GRAVITY URINE: 1.01
T4 FREE SERPL-MCNC: 1.3 NG/DL
TRIGL SERPL-MCNC: 100 MG/DL
TSH SERPL-ACNC: 2.96 UIU/ML
UROBILINOGEN URINE: 0.2 MG/DL
WBC # FLD AUTO: 6.36 K/UL
WHITE BLOOD CELLS URINE: 0 /HPF

## 2025-04-03 ENCOUNTER — RX RENEWAL (OUTPATIENT)
Age: 80
End: 2025-04-03

## 2025-04-24 ENCOUNTER — APPOINTMENT (OUTPATIENT)
Dept: INTERNAL MEDICINE | Facility: CLINIC | Age: 80
End: 2025-04-24
Payer: MEDICARE

## 2025-04-24 VITALS
BODY MASS INDEX: 29.06 KG/M2 | OXYGEN SATURATION: 98 % | RESPIRATION RATE: 13 BRPM | HEIGHT: 70 IN | DIASTOLIC BLOOD PRESSURE: 70 MMHG | HEART RATE: 86 BPM | WEIGHT: 203 LBS | SYSTOLIC BLOOD PRESSURE: 125 MMHG

## 2025-04-24 DIAGNOSIS — E78.5 HYPERLIPIDEMIA, UNSPECIFIED: ICD-10-CM

## 2025-04-24 DIAGNOSIS — I10 ESSENTIAL (PRIMARY) HYPERTENSION: ICD-10-CM

## 2025-04-24 DIAGNOSIS — R73.01 IMPAIRED FASTING GLUCOSE: ICD-10-CM

## 2025-04-24 DIAGNOSIS — E03.9 HYPOTHYROIDISM, UNSPECIFIED: ICD-10-CM

## 2025-04-24 DIAGNOSIS — I25.10 ATHEROSCLEROTIC HEART DISEASE OF NATIVE CORONARY ARTERY W/OUT ANGINA PECTORIS: ICD-10-CM

## 2025-04-24 DIAGNOSIS — Z79.899 OTHER LONG TERM (CURRENT) DRUG THERAPY: ICD-10-CM

## 2025-04-24 PROCEDURE — 36415 COLL VENOUS BLD VENIPUNCTURE: CPT

## 2025-04-24 PROCEDURE — 99214 OFFICE O/P EST MOD 30 MIN: CPT

## 2025-04-24 PROCEDURE — G2211 COMPLEX E/M VISIT ADD ON: CPT

## 2025-04-25 LAB
ALBUMIN SERPL ELPH-MCNC: 4.7 G/DL
ALP BLD-CCNC: 68 U/L
ALT SERPL-CCNC: 23 U/L
ANION GAP SERPL CALC-SCNC: 15 MMOL/L
AST SERPL-CCNC: 19 U/L
BASOPHILS # BLD AUTO: 0.05 K/UL
BASOPHILS NFR BLD AUTO: 0.7 %
BILIRUB SERPL-MCNC: 0.5 MG/DL
BUN SERPL-MCNC: 23 MG/DL
CALCIUM SERPL-MCNC: 9.8 MG/DL
CHLORIDE SERPL-SCNC: 99 MMOL/L
CHOLEST SERPL-MCNC: 143 MG/DL
CO2 SERPL-SCNC: 23 MMOL/L
CREAT SERPL-MCNC: 1.27 MG/DL
EGFRCR SERPLBLD CKD-EPI 2021: 57 ML/MIN/1.73M2
EOSINOPHIL # BLD AUTO: 0.16 K/UL
EOSINOPHIL NFR BLD AUTO: 2.3 %
ESTIMATED AVERAGE GLUCOSE: 140 MG/DL
GLUCOSE SERPL-MCNC: 103 MG/DL
HBA1C MFR BLD HPLC: 6.5 %
HCT VFR BLD CALC: 42.2 %
HDLC SERPL-MCNC: 52 MG/DL
HGB BLD-MCNC: 13.8 G/DL
IMM GRANULOCYTES NFR BLD AUTO: 0.4 %
LDLC SERPL-MCNC: 76 MG/DL
LYMPHOCYTES # BLD AUTO: 1.54 K/UL
LYMPHOCYTES NFR BLD AUTO: 22.2 %
MAN DIFF?: NORMAL
MCHC RBC-ENTMCNC: 30.7 PG
MCHC RBC-ENTMCNC: 32.7 G/DL
MCV RBC AUTO: 94 FL
MONOCYTES # BLD AUTO: 0.66 K/UL
MONOCYTES NFR BLD AUTO: 9.5 %
NEUTROPHILS # BLD AUTO: 4.51 K/UL
NEUTROPHILS NFR BLD AUTO: 64.9 %
NONHDLC SERPL-MCNC: 91 MG/DL
PLATELET # BLD AUTO: 230 K/UL
POTASSIUM SERPL-SCNC: 4.3 MMOL/L
PROT SERPL-MCNC: 7.4 G/DL
RBC # BLD: 4.49 M/UL
RBC # FLD: 14.6 %
SODIUM SERPL-SCNC: 137 MMOL/L
TRIGL SERPL-MCNC: 74 MG/DL
TSH SERPL-ACNC: 5.13 UIU/ML
WBC # FLD AUTO: 6.95 K/UL

## 2025-04-25 RX ORDER — LEVOTHYROXINE SODIUM 0.17 MG/1
175 TABLET ORAL
Qty: 90 | Refills: 1 | Status: ACTIVE | COMMUNITY
Start: 2025-04-25 | End: 1900-01-01

## 2025-04-29 ENCOUNTER — NON-APPOINTMENT (OUTPATIENT)
Age: 80
End: 2025-04-29

## 2025-05-05 ENCOUNTER — NON-APPOINTMENT (OUTPATIENT)
Age: 80
End: 2025-05-05

## 2025-07-07 ENCOUNTER — OFFICE (OUTPATIENT)
Dept: URBAN - METROPOLITAN AREA CLINIC 115 | Facility: CLINIC | Age: 80
Setting detail: OPHTHALMOLOGY
End: 2025-07-07
Payer: MEDICARE

## 2025-07-07 DIAGNOSIS — Z96.1: ICD-10-CM

## 2025-07-07 DIAGNOSIS — H01.001: ICD-10-CM

## 2025-07-07 DIAGNOSIS — H01.004: ICD-10-CM

## 2025-07-07 DIAGNOSIS — H35.033: ICD-10-CM

## 2025-07-07 DIAGNOSIS — H11.153: ICD-10-CM

## 2025-07-07 DIAGNOSIS — H43.391: ICD-10-CM

## 2025-07-07 DIAGNOSIS — H43.811: ICD-10-CM

## 2025-07-07 PROCEDURE — 92014 COMPRE OPH EXAM EST PT 1/>: CPT | Performed by: OPHTHALMOLOGY

## 2025-07-07 ASSESSMENT — LID EXAM ASSESSMENTS
OS_BLEPHARITIS: LUL T
OD_BLEPHARITIS: RUL T

## 2025-07-07 ASSESSMENT — REFRACTION_CURRENTRX
OD_AXIS: 079
OS_VPRISM_DIRECTION: PROGS
OD_VPRISM_DIRECTION: PROGS
OD_CYLINDER: -1.50
OD_SPHERE: +0.75
OD_OVR_VA: 20/
OS_AXIS: 090
OS_OVR_VA: 20/
OD_ADD: +2.75
OS_SPHERE: PLANO
OS_CYLINDER: -0.50
OS_ADD: +2.75

## 2025-07-07 ASSESSMENT — REFRACTION_AUTOREFRACTION
OS_AXIS: 072
OD_AXIS: 090
OD_CYLINDER: -1.50
OS_SPHERE: +0.50
OD_SPHERE: +1.25
OS_CYLINDER: -1.00

## 2025-07-07 ASSESSMENT — VISUAL ACUITY
OD_BCVA: 20/25-2
OS_BCVA: 20/25-

## 2025-07-07 ASSESSMENT — CONFRONTATIONAL VISUAL FIELD TEST (CVF)
OS_FINDINGS: FULL
OD_FINDINGS: FULL

## 2025-07-07 ASSESSMENT — TONOMETRY
OD_IOP_MMHG: 12
OS_IOP_MMHG: 11

## 2025-07-14 ENCOUNTER — NON-APPOINTMENT (OUTPATIENT)
Age: 80
End: 2025-07-14

## 2025-07-14 ENCOUNTER — RX RENEWAL (OUTPATIENT)
Age: 80
End: 2025-07-14

## 2025-08-25 ENCOUNTER — APPOINTMENT (OUTPATIENT)
Dept: INTERNAL MEDICINE | Facility: CLINIC | Age: 80
End: 2025-08-25
Payer: MEDICARE

## 2025-08-25 VITALS
DIASTOLIC BLOOD PRESSURE: 70 MMHG | HEIGHT: 70 IN | BODY MASS INDEX: 27.2 KG/M2 | HEART RATE: 91 BPM | WEIGHT: 190 LBS | RESPIRATION RATE: 13 BRPM | SYSTOLIC BLOOD PRESSURE: 120 MMHG | OXYGEN SATURATION: 97 %

## 2025-08-25 DIAGNOSIS — I25.10 ATHEROSCLEROTIC HEART DISEASE OF NATIVE CORONARY ARTERY W/OUT ANGINA PECTORIS: ICD-10-CM

## 2025-08-25 DIAGNOSIS — E03.9 HYPOTHYROIDISM, UNSPECIFIED: ICD-10-CM

## 2025-08-25 DIAGNOSIS — E78.5 HYPERLIPIDEMIA, UNSPECIFIED: ICD-10-CM

## 2025-08-25 DIAGNOSIS — I10 ESSENTIAL (PRIMARY) HYPERTENSION: ICD-10-CM

## 2025-08-25 DIAGNOSIS — Z79.899 OTHER LONG TERM (CURRENT) DRUG THERAPY: ICD-10-CM

## 2025-08-25 DIAGNOSIS — R73.01 IMPAIRED FASTING GLUCOSE: ICD-10-CM

## 2025-08-25 PROCEDURE — G2211 COMPLEX E/M VISIT ADD ON: CPT

## 2025-08-25 PROCEDURE — 36415 COLL VENOUS BLD VENIPUNCTURE: CPT

## 2025-08-25 PROCEDURE — 99214 OFFICE O/P EST MOD 30 MIN: CPT

## 2025-08-26 LAB
ALBUMIN SERPL ELPH-MCNC: 4.7 G/DL
ALP BLD-CCNC: 65 U/L
ALT SERPL-CCNC: 23 U/L
ANION GAP SERPL CALC-SCNC: 14 MMOL/L
AST SERPL-CCNC: 18 U/L
BASOPHILS # BLD AUTO: 0.04 K/UL
BASOPHILS NFR BLD AUTO: 0.6 %
BILIRUB SERPL-MCNC: 0.6 MG/DL
BUN SERPL-MCNC: 21 MG/DL
CALCIUM SERPL-MCNC: 9.7 MG/DL
CHLORIDE SERPL-SCNC: 102 MMOL/L
CHOLEST SERPL-MCNC: 125 MG/DL
CO2 SERPL-SCNC: 24 MMOL/L
CREAT SERPL-MCNC: 1.35 MG/DL
EGFRCR SERPLBLD CKD-EPI 2021: 53 ML/MIN/1.73M2
EOSINOPHIL # BLD AUTO: 0.18 K/UL
EOSINOPHIL NFR BLD AUTO: 2.6 %
ESTIMATED AVERAGE GLUCOSE: 137 MG/DL
GLUCOSE SERPL-MCNC: 91 MG/DL
HBA1C MFR BLD HPLC: 6.4 %
HCT VFR BLD CALC: 41.9 %
HDLC SERPL-MCNC: 47 MG/DL
HGB BLD-MCNC: 13.5 G/DL
IMM GRANULOCYTES NFR BLD AUTO: 0.4 %
LDLC SERPL-MCNC: 61 MG/DL
LYMPHOCYTES # BLD AUTO: 1.71 K/UL
LYMPHOCYTES NFR BLD AUTO: 25 %
MAN DIFF?: NORMAL
MCHC RBC-ENTMCNC: 30.3 PG
MCHC RBC-ENTMCNC: 32.2 G/DL
MCV RBC AUTO: 93.9 FL
MONOCYTES # BLD AUTO: 0.66 K/UL
MONOCYTES NFR BLD AUTO: 9.7 %
NEUTROPHILS # BLD AUTO: 4.21 K/UL
NEUTROPHILS NFR BLD AUTO: 61.7 %
NONHDLC SERPL-MCNC: 78 MG/DL
PLATELET # BLD AUTO: 213 K/UL
POTASSIUM SERPL-SCNC: 4.2 MMOL/L
PROT SERPL-MCNC: 7.3 G/DL
RBC # BLD: 4.46 M/UL
RBC # FLD: 14.2 %
SODIUM SERPL-SCNC: 140 MMOL/L
TRIGL SERPL-MCNC: 89 MG/DL
TSH SERPL-ACNC: 1.06 UIU/ML
WBC # FLD AUTO: 6.83 K/UL

## 2025-08-27 ENCOUNTER — NON-APPOINTMENT (OUTPATIENT)
Age: 80
End: 2025-08-27